# Patient Record
Sex: FEMALE | Race: WHITE | Employment: FULL TIME | URBAN - METROPOLITAN AREA
[De-identification: names, ages, dates, MRNs, and addresses within clinical notes are randomized per-mention and may not be internally consistent; named-entity substitution may affect disease eponyms.]

---

## 2017-01-13 ENCOUNTER — HOSPITAL ENCOUNTER (OUTPATIENT)
Dept: PHYSICAL THERAPY | Age: 42
Discharge: HOME | End: 2017-01-13
Payer: COMMERCIAL

## 2017-01-13 PROCEDURE — 97162 PT EVAL MOD COMPLEX 30 MIN: CPT

## 2017-01-13 PROCEDURE — 97110 THERAPEUTIC EXERCISES: CPT

## 2017-01-16 ENCOUNTER — HOSPITAL ENCOUNTER (OUTPATIENT)
Dept: PHYSICAL THERAPY | Age: 42
Discharge: HOME | End: 2017-01-16
Payer: COMMERCIAL

## 2017-01-16 NOTE — PROGRESS NOTES
Ambulatory/Rehab Services H2 Model Falls Risk Assessment    Risk Factor Pts. ·   Confusion/Disorientation/Impulsivity  []    4 ·   Symptomatic Depression  []   2 ·   Altered Elimination  []   1 ·   Dizziness/Vertigo  []   1 ·   Gender (Male)  []   1 ·   Any administered antiepileptics (anticonvulsants):  []   2 ·   Any administered benzodiazepines:  []   1 ·   Visual Impairment (specify):  []   1 ·   Portable Oxygen Use  []   1 ·   Orthostatic ? BP  []   1 ·   History of Recent Falls (within 3 mos.)  [x]   5     Ability to Rise from Chair (choose one) Pts. ·   Ability to rise in a single movement  []   0 ·   Pushes up, successful in one attempt  [x]   1 ·   Multiple attempts, but successful  []   3 ·   Unable to rise without assistance  []   4   Total: (5 or greater = High Risk) 6     Falls Prevention Plan:   []                Physical Limitations to Exercise (specify):   []                Mobility Assistance Device (type):   []                Exercise/Equipment Adaptation (specify):    ©2010 [facility] [address] Patent #[id number].  Federal Law prohibits the replication, distribution or use without written permission from [facility] [employer]

## 2017-01-16 NOTE — PROGRESS NOTES
Scooby Nunez  : 1975 Therapy Center at [facility]  [address]  [phone]   [phone]         OUTPATIENT PHYSICAL THERAPY:Initial Assessment 2017      ICD-10: Treatment Diagnosis:   Lumbago with sciatica, right side (M54.41)       Precautions/Allergies:   Tramadol   Fall Risk Score: 7 (? 5 = High Risk)  MD Orders: Eval and Treat  MEDICAL/REFERRING DIAGNOSIS:  Lumbar radiculopathy [M54.16]   DATE OF ONSET: fall on 11/10/2016  REFERRING PHYSICIAN: Ganga Torres [address]: 2017     INITIAL ASSESSMENT:  Ms. Scooby Nunez has attended 1 physical therapy session including initial evaluation. Scooby Nunez exhibits decreased flexibility, increased pain, decreased postural and core strength, decreased functional tolerance. Patient demonstrates pelvic malalignment upon initial evaluation with decreased posture and impaired transfer tolerance. Scooby Nunez will benefit from skilled PT (medically necessary) to address above deficits affecting participation in basic ADLs and overall functional tolerance. Manual techniques (stretching, joint mobilizations, soft tissue mobilization/myofascial release), postural exercises/education, therapeutic techniques/activities, and HEP will be performed as appropriate addressing Antonette Liz's current condition. PROBLEM LIST (Impacting functional limitations):  1. Decreased Strength  2. Decreased ADL/Functional Activities  3. Decreased Transfer Abilities  4. Decreased Ambulation Ability/Technique  5. Decreased Balance  6. Increased Pain  7. Decreased Activity Tolerance  8. Decreased Work Simplification/Energy Conservation Techniques  9. Increased Fatigue  10. Decreased Flexibility/Joint Mobility  11. Decreased Knowledge of Precautions  12. Decreased [place] with Home Exercise Program  13.  impaired body mechanics INTERVENTIONS PLANNED:  1. Balance Exercise  2. Bed Mobility  3. Cold  4. Cryotherapy  5. Electrical Stimulation  6. Family Education  7. Gait Training  8. Heat  9. Home Exercise Program (HEP)  10. Manual Therapy  11. Neuromuscular Re-education/Strengthening  12. Range of Motion (ROM)  13. Therapeutic Activites  14. Therapeutic Exercise/Strengthening  15. Transfer Training   TREATMENT PLAN:  Effective Dates: 1/13/2017 TO 4/13/2017. Frequency/Duration: 2 times a week for 8 weeks  GOALS: (Goals have been discussed and agreed upon with patient.)  Short Term Goals 4 weeks   1. Shalini Hernandez will be independent with HEP  2. Shalini Hernandez will participate in LE stretching program to increase flexibility  3. Shalini Hernandez will participate in core stabilization exercises to help with stabilization during ADLs  4. Shalini Hernandez will participate in LE strengthening program with weights as appropriate to help with gait and elevations  5. Shalini Hernandez will participate in static and dynamic balance activities to decrease the risk for falls  6. Shalini Hernandez will tolerate manual therapy/joint mobilizations/soft tissue to increase ROM and decrease pain  7. Patient will demonstrate safe body mechanics with lifting, bending, and transfers for improved lumbar spine pain and activity tolerance. Long Term Goals 8 weeks   1. Shalini Hernandez will demonstrate a 20 point improvement on the Oswestry to show improvement in function  2. Shalini Hernandez will report 0/10 pain at rest and during ADLs  3. Shalini Hernandez will demonstrate 5/5 LE strength on manual muscle testing  4. Shalini Hernandez will be able to perform SLS >5 seconds bilaterally to help with gait and improve balance  Rehabilitation Potential For Stated Goals: Good  Regarding Antonette Liz's therapy, I certify that the treatment plan above will be carried out by a therapist or under their direction.   Thank you for this referral,  Nellie Pope, PT     Referring Physician Signature: Stephanie Mueller*              Date                    HISTORY:   History of Present Injury/Illness (Reason for Referral):  Ms. Reyna Eason reports getting her foot caught between boxes in a freezer at work and falling backwards into a metal rack and boxes on 11/10/2016. She reports immediate pain at that time that has continued and is increased with walking, getting up from the bed, up from a chair, and with sitting greater than 15 minutes. She reports that her pain is right sided and radiates into the right foot. She also reports that she has difficulty with picking any bending, twisting, and lifting. MRI imaging demonstrated no discal injury and no significant structural damage to the lumbar tissue. Patient goals are to return to work and to be safe with back movement for prevention of re-injury or further injury. Patient reports that symptoms have not changed since injury. Past Medical History/Comorbidities:   Ms. Reyna Eason  has a past medical history of Hypothyroid. Ms. Reyna Eason  has a past surgical history that includes hysterectomy and cholecystectomy. Social History/Living Environment:     lives in private residence with family  Prior Level of Function/Work/Activity:  Patient worked and was independent with all home and work duties prior to injury  Note: Patient denies any increase of symptoms with cough, sneeze or valsalva. Patient denies any saddle paresthesia or bowel/bladder deficits. Personal Factors:          Coping Style:  Seems to catastrophize injury        Social Background:  Some indications of family stress        Profession:          Past/Current Experience:  No previous history of back pain        Other factors that influence how disability is experienced by the patient:  Previous high level family stress due to loss of spouse in traumatic event.   Current Medications:    Current Outpatient Prescriptions:     diclofenac EC (VOLTAREN) 75 mg EC tablet, Take 1 Tab by mouth two (2) times a day., Disp: 10 Tab, Rfl: 0    levothyroxine (SYNTHROID) 75 mcg tablet, Take  by mouth Daily (before breakfast). , Disp: , Rfl:    Flexeril  norco  Ibeuprophen    Date Last Reviewed:  1/16/2017   Number of Personal Factors/Comorbidities that affect the Plan of Care: 1-2: MODERATE COMPLEXITY   EXAMINATION:   Observation/Orthostatic Postural Assessment:          Antalgic gait pattern with decreased right LE loading during gait. Right trunk lean in standing. Palpation:          Elevated right psis in standing and sitting. Tenderness with palpation of right quadratus lumborum and right piriformis  ROM:            AROM/PROM         Joint: Eval Date: 11/13/2106 Re-Assess Date:  Re-Assess Date:     RIGHT LEFT RIGHT LEFT RIGHT LEFT   Knee Extension         Knee Flexion         Hip Flexion         Hip Abduction         Lumbar Flexion 50% with Pain        Lumbar Extension 50% with pain        Lumbar Side-bending 50% with pain 50%with pain       Lumbar Rotation           Strength:    Joint: Eval Date:  Re-Assess Date:  Re-Assess Date:     RIGHT LEFT RIGHT LEFT RIGHT LEFT   Knee Flexion 5/5 5/5       Knee Extension 5/5 5/5       Hip Flexion 4/5 5/5       Hip Abduction 4/5 5/5       Ankle Dorsiflexion 5/5 5/5                           Single leg stance right/left: needed support on right              Deep squat: unable due to Right LE pain and c/o weakness    Ham 90/90:   RIGHT LE: -40   LEFT LE: -20    Special Tests: Assessed @ Initial Visit    ISRRAEL 4: + on right   SLUMP TEST: +on right  Neurological Screen: Assessed @ Initial Visit    RADIATING SYMPTOMS?: YES  Functional Mobility:  Assessed @ Initial Visit Affecting participation in basic ADLs and functional tasks.   Poor spinal mechanics with supine-sit, sit-stand, and picking up objects that demonstrated increased spinal flexion and discal loading    Balance:          Poor stability on right LE   Body Structures Involved:  1. Nerves  2. Joints  3. Muscles  4. Ligaments Body Functions Affected:  1. Mental  2. Sensory/Pain  3. Neuromusculoskeletal  4. Movement Related  5. postural awareness Activities and Participation Affected:  1. General Tasks and Demands  2. Mobility  3. Self Care  4. Domestic Life  5. Interpersonal Interactions and Relationships  6. Community, Social and [place]  7. work duties   Number of elements that affect the Plan of Care: 3: MODERATE COMPLEXITY   CLINICAL PRESENTATION:   Presentation: Evolving clinical presentation with changing clinical characteristics: MODERATE COMPLEXITY   CLINICAL DECISION MAKING:   Outcome Measure: Tool Used: Modified Oswestry Low Back Pain Questionnaire  Score:  Initial: 35/50  Most Recent: X/50 (Date: -- )   Interpretation of Score: Each section is scored on a 0-5 scale, 5 representing the greatest disability. The scores of each section are added together for a total score of 50. Score 0 1-10 11-20 21-30 31-40 41-49 50   Modifier CH CI CJ CK CL CM CN       Medical Necessity:   · Skilled intervention continues to be required due to above deficits affecting participation in basic ADLs and overall functional tolerance. Reason for Services/Other Comments:  · Patient continues to require skilled intervention due to  above deficits affecting participation in basic ADLs and overall functional tolerance. Use of outcome tool(s) and clinical judgement create a POC that gives a: Questionable prediction of patient's progress: MODERATE COMPLEXITY   TREATMENT:   (In addition to Assessment/Re-Assessment sessions the following treatments were rendered)  THERAPEUTIC EXERCISE: (20 minutes):  Exercises per grid below to improve mobility, strength and balance. Required minimal visual and verbal cues to promote proper body alignment and promote proper body posture. Progressed resistance, range and complexity of movement as indicated.      Date:  1/13/2017 Date:   Date: Activity/Exercise Parameters Parameters Parameters   Sciatic nerve mobilization 30\"x3     Prone lying for decreased lumbar loading x5'     Patient education on body mechanics x10'                                   MANUAL THERAPY: (0 minutes): Joint mobilization, Soft tissue mobilization and Manipulation was utilized and necessary because of the patient's restricted joint motion, painful spasm, restricted motion of soft tissue and.   (Used abbreviations: MET - muscle energy technique; PNF - proprioceptive neuromuscular facilitation; NMR - neuromuscular re-education; AP - anterior to posterior; PA - posterior to anterior)    MODALITIES: (10 minutes):      *  Cold Pack Therapy in order to provide analgesia, relieve muscle spasm and reduce inflammation and edema. No abnormal skin reaction present. Treatment/Session Assessment:  Patient had improved pain with transfers after treatment. · Pain/ Symptoms: Initial:   8/10 Post Session:  6/10 ·   Compliance with Program/Exercises: Will assess as treatment progresses. · Recommendations/Intent for next treatment session: \"Next visit will focus on advancements to more challenging activities\".   Total Treatment Duration:  PT Patient Time In/Time Out  Time In: 1300  Time Out: 1002 [facility],

## 2017-01-16 NOTE — PROGRESS NOTES
Fady Yu  : 1975 Therapy Center at [address]  [phone]   [phone]        OUTPATIENT DAILY NOTE    NAME/AGE/GENDER: Fday Yu is a 42 y.o. female. DATE: 2017    Patient cancelled for appointment today due to daughter-in-laws water broke. Will plan to follow up on next scheduled visit.     Rosario Adhikari, PT

## 2017-01-18 ENCOUNTER — HOSPITAL ENCOUNTER (OUTPATIENT)
Dept: PHYSICAL THERAPY | Age: 42
Discharge: HOME | End: 2017-01-18
Payer: COMMERCIAL

## 2017-01-18 PROCEDURE — 97140 MANUAL THERAPY 1/> REGIONS: CPT

## 2017-01-18 PROCEDURE — 97110 THERAPEUTIC EXERCISES: CPT

## 2017-01-18 NOTE — PROGRESS NOTES
Fela Martinez  : 1975 Therapy Center at [place] [address] [address] [facility]  [phone]   [phone]         OUTPATIENT PHYSICAL THERAPY:Daily Note 2017      ICD-10: Treatment Diagnosis:   Lumbago with sciatica, right side (M54.41)       Precautions/Allergies:   Tramadol   Fall Risk Score: 7 (? 5 = High Risk)  MD Orders: Eval and Treat  MEDICAL/REFERRING DIAGNOSIS:  Lumbar radiculopathy [M54.16]   DATE OF ONSET: fall on 11/10/2016  REFERRING PHYSICIAN: Ganga Torres [address]: 2017     INITIAL ASSESSMENT:  Ms. Fela Martinez has attended 1 physical therapy session including initial evaluation. Fela Martinez exhibits decreased flexibility, increased pain, decreased postural and core strength, decreased functional tolerance. Patient demonstrates pelvic malalignment upon initial evaluation with decreased posture and impaired transfer tolerance. Fela Martinez will benefit from skilled PT (medically necessary) to address above deficits affecting participation in basic ADLs and overall functional tolerance. Manual techniques (stretching, joint mobilizations, soft tissue mobilization/myofascial release), postural exercises/education, therapeutic techniques/activities, and HEP will be performed as appropriate addressing Antonette Liz's current condition. PROBLEM LIST (Impacting functional limitations):  1. Decreased Strength  2. Decreased ADL/Functional Activities  3. Decreased Transfer Abilities  4. Decreased Ambulation Ability/Technique  5. Decreased Balance  6. Increased Pain  7. Decreased Activity Tolerance  8. Decreased Work Simplification/Energy Conservation Techniques  9. Increased Fatigue  10. Decreased Flexibility/Joint Mobility  11. Decreased Knowledge of Precautions  12. Decreased [place] with Home Exercise Program  13.  impaired body mechanics INTERVENTIONS PLANNED:  1. Balance Exercise  2. Bed Mobility  3. Cold  4. Cryotherapy  5. Electrical Stimulation  6. Family Education  7. Gait Training  8. Heat  9. Home Exercise Program (HEP)  10. Manual Therapy  11. Neuromuscular Re-education/Strengthening  12. Range of Motion (ROM)  13. Therapeutic Activites  14. Therapeutic Exercise/Strengthening  15. Transfer Training   TREATMENT PLAN:  Effective Dates: 1/13/2017 TO 4/13/2017. Frequency/Duration: 2 times a week for 8 weeks  GOALS: (Goals have been discussed and agreed upon with patient.)  Short Term Goals 4 weeks   1. Lucy Rizzo will be independent with HEP  2. Lucy Shepherdkel will participate in LE stretching program to increase flexibility  3. Falugniestino Matthias will participate in core stabilization exercises to help with stabilization during ADLs  4. Falguniestrosaura Shepherdkel will participate in LE strengthening program with weights as appropriate to help with gait and elevations  5. Falguniestrosaura Matthias will participate in static and dynamic balance activities to decrease the risk for falls  6. Lucy Shepherdkel will tolerate manual therapy/joint mobilizations/soft tissue to increase ROM and decrease pain  7. Patient will demonstrate safe body mechanics with lifting, bending, and transfers for improved lumbar spine pain and activity tolerance. Long Term Goals 8 weeks   1. Lucy Shepherdkel will demonstrate a 20 point improvement on the Oswestry to show improvement in function  2. Selestrosaura Shepherdkel will report 0/10 pain at rest and during ADLs  3. Selestrosaura Shepherdkel will demonstrate 5/5 LE strength on manual muscle testing  4. Lucy Shepherdkel will be able to perform SLS >5 seconds bilaterally to help with gait and improve balance  Rehabilitation Potential For Stated Goals: Good  Regarding Antonette Liz's therapy, I certify that the treatment plan above will be carried out by a therapist or under their direction.   Thank you for this referral,  Brice Neely, PT     Referring Physician Signature: Melanie Ashley*              Date                    HISTORY:   History of Present Injury/Illness (Reason for Referral):  Ms. Gianna Wheeler reports getting her foot caught between boxes in a freezer at work and falling backwards into a metal rack and boxes on 11/10/2016. She reports immediate pain at that time that has continued and is increased with walking, getting up from the bed, up from a chair, and with sitting greater than 15 minutes. She reports that her pain is right sided and radiates into the right foot. She also reports that she has difficulty with picking any bending, twisting, and lifting. MRI imaging demonstrated no discal injury and no significant structural damage to the lumbar tissue. Patient goals are to return to work and to be safe with back movement for prevention of re-injury or further injury. Patient reports that symptoms have not changed since injury. Past Medical History/Comorbidities:   Ms. Gianna Wheeler  has a past medical history of Hypothyroid. Ms. Gianna Wheeler  has a past surgical history that includes hysterectomy and cholecystectomy. Social History/Living Environment:     lives in private residence with family  Prior Level of Function/Work/Activity:  Patient worked and was independent with all home and work duties prior to injury  Note: Patient denies any increase of symptoms with cough, sneeze or valsalva. Patient denies any saddle paresthesia or bowel/bladder deficits. Personal Factors:          Coping Style:  Seems to catastrophize injury        Social Background:  Some indications of family stress        Profession:          Past/Current Experience:  No previous history of back pain        Other factors that influence how disability is experienced by the patient:  Previous high level family stress due to loss of spouse in traumatic event.   Current Medications:    Current Outpatient Prescriptions:     diclofenac EC (VOLTAREN) 75 mg EC tablet, Take 1 Tab by mouth two (2) times a day., Disp: 10 Tab, Rfl: 0    levothyroxine (SYNTHROID) 75 mcg tablet, Take  by mouth Daily (before breakfast). , Disp: , Rfl:    Flexeril  norco  Ibeuprophen    Date Last Reviewed:  1/18/2017   Number of Personal Factors/Comorbidities that affect the Plan of Care: 1-2: MODERATE COMPLEXITY   EXAMINATION:   Observation/Orthostatic Postural Assessment:          Antalgic gait pattern with decreased right LE loading during gait. Right trunk lean in standing. Palpation:          Elevated right psis in standing and sitting. Tenderness with palpation of right quadratus lumborum and right piriformis  ROM:            AROM/PROM         Joint: Eval Date: 11/13/2106 Re-Assess Date:  Re-Assess Date:     RIGHT LEFT RIGHT LEFT RIGHT LEFT   Knee Extension         Knee Flexion         Hip Flexion         Hip Abduction         Lumbar Flexion 50% with Pain        Lumbar Extension 50% with pain        Lumbar Side-bending 50% with pain 50%with pain       Lumbar Rotation           Strength:    Joint: Eval Date:  Re-Assess Date:  Re-Assess Date:     RIGHT LEFT RIGHT LEFT RIGHT LEFT   Knee Flexion 5/5 5/5       Knee Extension 5/5 5/5       Hip Flexion 4/5 5/5       Hip Abduction 4/5 5/5       Ankle Dorsiflexion 5/5 5/5                           Single leg stance right/left: needed support on right              Deep squat: unable due to Right LE pain and c/o weakness    Ham 90/90:   RIGHT LE: -40   LEFT LE: -20    Special Tests: Assessed @ Initial Visit    ISRRAEL 4: + on right   SLUMP TEST: +on right  Neurological Screen: Assessed @ Initial Visit    RADIATING SYMPTOMS?: YES  Functional Mobility:  Assessed @ Initial Visit Affecting participation in basic ADLs and functional tasks.   Poor spinal mechanics with supine-sit, sit-stand, and picking up objects that demonstrated increased spinal flexion and discal loading    Balance:          Poor stability on right LE   Body Structures Involved:  1. Nerves  2. Joints  3. Muscles  4. Ligaments Body Functions Affected:  1. Mental  2. Sensory/Pain  3. Neuromusculoskeletal  4. Movement Related  5. postural awareness Activities and Participation Affected:  1. General Tasks and Demands  2. Mobility  3. Self Care  4. Domestic Life  5. Interpersonal Interactions and Relationships  6. Community, Social and [place]  7. work duties   Number of elements that affect the Plan of Care: 3: MODERATE COMPLEXITY   CLINICAL PRESENTATION:   Presentation: Evolving clinical presentation with changing clinical characteristics: MODERATE COMPLEXITY   CLINICAL DECISION MAKING:   Outcome Measure: Tool Used: Modified Oswestry Low Back Pain Questionnaire  Score:  Initial: 35/50  Most Recent: X/50 (Date: -- )   Interpretation of Score: Each section is scored on a 0-5 scale, 5 representing the greatest disability. The scores of each section are added together for a total score of 50. Score 0 1-10 11-20 21-30 31-40 41-49 50   Modifier CH CI CJ CK CL CM CN       Medical Necessity:   · Skilled intervention continues to be required due to above deficits affecting participation in basic ADLs and overall functional tolerance. Reason for Services/Other Comments:  · Patient continues to require skilled intervention due to  above deficits affecting participation in basic ADLs and overall functional tolerance. Use of outcome tool(s) and clinical judgement create a POC that gives a: Questionable prediction of patient's progress: MODERATE COMPLEXITY   TREATMENT:   (In addition to Assessment/Re-Assessment sessions the following treatments were rendered)  THERAPEUTIC EXERCISE: (20 minutes):  Exercises per grid below to improve mobility, strength and balance. Required minimal visual and verbal cues to promote proper body alignment and promote proper body posture. Progressed resistance, range and complexity of movement as indicated.      Date:  1/13/2017 Date:  1/18/2017 Date: Activity/Exercise Parameters Parameters Parameters   Sciatic nerve mobilization 30\"x3     Prone lying for decreased lumbar loading x5' x5'    Patient education on body mechanics x10' x5'    physiostep  x10' w/ moist hot pack to lumbar spine                            MANUAL THERAPY: ( 25 minutes): Joint mobilization to right sacroiliac junction, Soft tissue mobilization and Manipulation was utilized and necessary because of the patient's restricted joint motion, painful spasm, restricted motion of soft tissue. Soft tissue mobilization to right thoracolumbar paraspinals. (Used abbreviations: MET - muscle energy technique; PNF - proprioceptive neuromuscular facilitation; NMR - neuromuscular re-education; AP - anterior to posterior; PA - posterior to anterior)    MODALITIES: (10 minutes):      *  Cold Pack Therapy in order to provide analgesia, relieve muscle spasm and reduce inflammation and edema. No abnormal skin reaction present. Treatment/Session Assessment:  Patient had improved pain with transfers after treatment. · Pain/ Symptoms: Initial:   8/10 Post Session:  6/10 ·   Compliance with Program/Exercises: Will assess as treatment progresses. · Recommendations/Intent for next treatment session: \"Next visit will focus on advancements to more challenging activities\".   Total Treatment Duration:  PT Patient Time In/Time Out  Time In: 1015  Time Out: [address],

## 2017-01-20 ENCOUNTER — HOSPITAL ENCOUNTER (OUTPATIENT)
Dept: PHYSICAL THERAPY | Age: 42
Discharge: HOME | End: 2017-01-20
Payer: COMMERCIAL

## 2017-01-20 NOTE — PROGRESS NOTES
Brisa Naranjo  : 1975 Therapy Center at [address]  [phone]   [phone]        OUTPATIENT DAILY NOTE    NAME/AGE/GENDER: Brisa Naranjo is a 42 y.o. female. DATE: 2017    Patient cancelled for appointment today due to family issues. Will plan to follow up on next scheduled visit.     Sara Malik, PT

## 2017-01-23 ENCOUNTER — HOSPITAL ENCOUNTER (OUTPATIENT)
Dept: PHYSICAL THERAPY | Age: 42
Discharge: HOME | End: 2017-01-23
Payer: COMMERCIAL

## 2017-01-23 PROCEDURE — 97140 MANUAL THERAPY 1/> REGIONS: CPT

## 2017-01-23 PROCEDURE — 97110 THERAPEUTIC EXERCISES: CPT

## 2017-01-23 NOTE — PROGRESS NOTES
Uday Prieto  : 1975 [facility] at  [address]  [phone]   [phone]         OUTPATIENT PHYSICAL THERAPY:Daily Note 2017      ICD-10: Treatment Diagnosis:   Lumbago with sciatica, right side (M54.41)       Precautions/Allergies:   Tramadol   Fall Risk Score: 7 (? 5 = High Risk)  MD Orders: Eval and Treat  MEDICAL/REFERRING DIAGNOSIS:  Lumbar radiculopathy [M54.16]   DATE OF ONSET: fall on 11/10/2016  REFERRING PHYSICIAN: Ganga Torres [address]: 2017     INITIAL ASSESSMENT:  Ms. Uday Prieto has attended 1 physical therapy session including initial evaluation. Uday Prieto exhibits decreased flexibility, increased pain, decreased postural and core strength, decreased functional tolerance. Patient demonstrates pelvic malalignment upon initial evaluation with decreased posture and impaired transfer tolerance. Uday Prieto will benefit from skilled PT (medically necessary) to address above deficits affecting participation in basic ADLs and overall functional tolerance. Manual techniques (stretching, joint mobilizations, soft tissue mobilization/myofascial release), postural exercises/education, therapeutic techniques/activities, and HEP will be performed as appropriate addressing Antonette Liz's current condition. PROBLEM LIST (Impacting functional limitations):  1. Decreased Strength  2. Decreased ADL/Functional Activities  3. Decreased Transfer Abilities  4. Decreased Ambulation Ability/Technique  5. Decreased Balance  6. Increased Pain  7. Decreased Activity Tolerance  8. Decreased Work Simplification/Energy Conservation Techniques  9. Increased Fatigue  10. Decreased Flexibility/Joint Mobility  11. Decreased Knowledge of Precautions  12. Decreased [place] with Home Exercise Program  13.  impaired body mechanics INTERVENTIONS PLANNED:  1. Balance Exercise  2. Bed Mobility  3. Cold  4. Cryotherapy  5. Electrical Stimulation  6. Family Education  7. Gait Training  8. Heat  9. Home Exercise Program (HEP)  10. Manual Therapy  11. Neuromuscular Re-education/Strengthening  12. Range of Motion (ROM)  13. Therapeutic Activites  14. Therapeutic Exercise/Strengthening  15. Transfer Training   TREATMENT PLAN:  Effective Dates: 1/13/2017 TO 4/13/2017. Frequency/Duration: 2 times a week for 8 weeks  GOALS: (Goals have been discussed and agreed upon with patient.)  Short Term Goals 4 weeks   1. Oziel Quiroga will be independent with HEP  2. Oziel Quiroga will participate in LE stretching program to increase flexibility  3. Oziel Quiroga will participate in core stabilization exercises to help with stabilization during ADLs  4. Oziel Quiroga will participate in LE strengthening program with weights as appropriate to help with gait and elevations  5. Oziel Quiroga will participate in static and dynamic balance activities to decrease the risk for falls  6. Oziel Quiroga will tolerate manual therapy/joint mobilizations/soft tissue to increase ROM and decrease pain  7. Patient will demonstrate safe body mechanics with lifting, bending, and transfers for improved lumbar spine pain and activity tolerance. Long Term Goals 8 weeks   1. Oziel Quiroga will demonstrate a 20 point improvement on the Oswestry to show improvement in function  2. Oziel Quiroga will report 0/10 pain at rest and during ADLs  3. Oziel Quiroga will demonstrate 5/5 LE strength on manual muscle testing  4. Oziel Quiroga will be able to perform SLS >5 seconds bilaterally to help with gait and improve balance  Rehabilitation Potential For Stated Goals: Good  Regarding Antonette Liz's therapy, I certify that the treatment plan above will be carried out by a therapist or under their direction.   Thank you for this referral,  Melody Warner PT     Referring Physician Signature: Jared Kidd*              Date                    HISTORY:   History of Present Injury/Illness (Reason for Referral):  Ms. Manuela Peña reports getting her foot caught between boxes in a freezer at work and falling backwards into a metal rack and boxes on 11/10/2016. She reports immediate pain at that time that has continued and is increased with walking, getting up from the bed, up from a chair, and with sitting greater than 15 minutes. She reports that her pain is right sided and radiates into the right foot. She also reports that she has difficulty with picking any bending, twisting, and lifting. MRI imaging demonstrated no discal injury and no significant structural damage to the lumbar tissue. Patient goals are to return to work and to be safe with back movement for prevention of re-injury or further injury. Patient reports that symptoms have not changed since injury. Past Medical History/Comorbidities:   Ms. Manuela Peña  has a past medical history of Hypothyroid. Ms. Manuela Peña  has a past surgical history that includes hysterectomy and cholecystectomy. Social History/Living Environment:     lives in private residence with family  Prior Level of Function/Work/Activity:  Patient worked and was independent with all home and work duties prior to injury  Note: Patient denies any increase of symptoms with cough, sneeze or valsalva. Patient denies any saddle paresthesia or bowel/bladder deficits. Personal Factors:          Coping Style:  Seems to catastrophize injury        Social Background:  Some indications of family stress        Profession:          Past/Current Experience:  No previous history of back pain        Other factors that influence how disability is experienced by the patient:  Previous high level family stress due to loss of spouse in traumatic event.   Current Medications:    Current Outpatient Prescriptions:     diclofenac EC (VOLTAREN) 75 mg EC tablet, Take 1 Tab by mouth two (2) times a day., Disp: 10 Tab, Rfl: 0    levothyroxine (SYNTHROID) 75 mcg tablet, Take  by mouth Daily (before breakfast). , Disp: , Rfl:    Flexeril  norco  Ibeuprophen    Date Last Reviewed:  1/23/2017   Number of Personal Factors/Comorbidities that affect the Plan of Care: 1-2: MODERATE COMPLEXITY   EXAMINATION:   Observation/Orthostatic Postural Assessment:          Antalgic gait pattern with decreased right LE loading during gait. Right trunk lean in standing. Palpation:          Elevated right psis in standing and sitting. Tenderness with palpation of right quadratus lumborum and right piriformis  ROM:            AROM/PROM         Joint: Eval Date: 11/13/2106 Re-Assess Date:  Re-Assess Date:     RIGHT LEFT RIGHT LEFT RIGHT LEFT   Knee Extension         Knee Flexion         Hip Flexion         Hip Abduction         Lumbar Flexion 50% with Pain        Lumbar Extension 50% with pain        Lumbar Side-bending 50% with pain 50%with pain       Lumbar Rotation           Strength:    Joint: Eval Date:  Re-Assess Date:  Re-Assess Date:     RIGHT LEFT RIGHT LEFT RIGHT LEFT   Knee Flexion 5/5 5/5       Knee Extension 5/5 5/5       Hip Flexion 4/5 5/5       Hip Abduction 4/5 5/5       Ankle Dorsiflexion 5/5 5/5                           Single leg stance right/left: needed support on right              Deep squat: unable due to Right LE pain and c/o weakness    Ham 90/90:   RIGHT LE: -40   LEFT LE: -20    Special Tests: Assessed @ Initial Visit    ISRRAEL 4: + on right   SLUMP TEST: +on right  Neurological Screen: Assessed @ Initial Visit    RADIATING SYMPTOMS?: YES  Functional Mobility:  Assessed @ Initial Visit Affecting participation in basic ADLs and functional tasks.   Poor spinal mechanics with supine-sit, sit-stand, and picking up objects that demonstrated increased spinal flexion and discal loading    Balance:          Poor stability on right LE   Body Structures Involved:  1. Nerves  2. Joints  3. Muscles  4. Ligaments Body Functions Affected:  1. Mental  2. Sensory/Pain  3. Neuromusculoskeletal  4. Movement Related  5. postural awareness Activities and Participation Affected:  1. General Tasks and Demands  2. Mobility  3. Self Care  4. Domestic Life  5. Interpersonal Interactions and Relationships  6. Community, Social and [place]  7. work duties   Number of elements that affect the Plan of Care: 3: MODERATE COMPLEXITY   CLINICAL PRESENTATION:   Presentation: Evolving clinical presentation with changing clinical characteristics: MODERATE COMPLEXITY   CLINICAL DECISION MAKING:   Outcome Measure: Tool Used: Modified Oswestry Low Back Pain Questionnaire  Score:  Initial: 35/50  Most Recent: X/50 (Date: -- )   Interpretation of Score: Each section is scored on a 0-5 scale, 5 representing the greatest disability. The scores of each section are added together for a total score of 50. Score 0 1-10 11-20 21-30 31-40 41-49 50   Modifier CH CI CJ CK CL CM CN       Medical Necessity:   · Skilled intervention continues to be required due to above deficits affecting participation in basic ADLs and overall functional tolerance. Reason for Services/Other Comments:  · Patient continues to require skilled intervention due to  above deficits affecting participation in basic ADLs and overall functional tolerance. Use of outcome tool(s) and clinical judgement create a POC that gives a: Questionable prediction of patient's progress: MODERATE COMPLEXITY   TREATMENT:   (In addition to Assessment/Re-Assessment sessions the following treatments were rendered)  THERAPEUTIC EXERCISE: (20 minutes):  Exercises per grid below to improve mobility, strength and balance. Required minimal visual and verbal cues to promote proper body alignment and promote proper body posture. Progressed resistance, range and complexity of movement as indicated.      Date:  1/13/2017 Date:  1/18/2017 Date:  1/23/2017   Activity/Exercise Parameters Parameters Parameters   Sciatic nerve mobilization 30\"x3     Prone lying for decreased lumbar loading x5' x5' x5'   Patient education on body mechanics x10' x5' x5'   physiostep  x10' w/ moist hot pack to lumbar spine x10' w/ moist hot pack to lumbar spine                           MANUAL THERAPY: ( 25 minutes): Joint mobilization to right sacroiliac junction, Soft tissue mobilization and Manipulation was utilized and necessary because of the patient's restricted joint motion, painful spasm, restricted motion of soft tissue. Soft tissue mobilization to right thoracolumbar paraspinals and right piriformis. (Used abbreviations: MET - muscle energy technique; PNF - proprioceptive neuromuscular facilitation; NMR - neuromuscular re-education; AP - anterior to posterior; PA - posterior to anterior)    MODALITIES: (10 minutes):      *  Cold Pack Therapy in order to provide analgesia, relieve muscle spasm and reduce inflammation and edema. No abnormal skin reaction present. Pre-treatment report: Patient reports improved pain with intensity with weight bearing. Treatment/Session Assessment:  Patient had improved pain with gait after treatment. Significant right piriformis pain with soft tissue mobilization. · Pain/ Symptoms: Initial:   6/10 Post Session:  5/10 ·   Compliance with Program/Exercises: Will assess as treatment progresses. · Recommendations/Intent for next treatment session: \"Next visit will focus on advancements to more challenging activities\".   Total Treatment Duration:  PT Patient Time In/Time Out  Time In: 1015  Time Out: [facility],

## 2017-01-25 ENCOUNTER — HOSPITAL ENCOUNTER (OUTPATIENT)
Dept: PHYSICAL THERAPY | Age: 42
Discharge: HOME | End: 2017-01-25
Payer: COMMERCIAL

## 2017-01-26 ENCOUNTER — HOSPITAL ENCOUNTER (OUTPATIENT)
Dept: PHYSICAL THERAPY | Age: 42
Discharge: HOME | End: 2017-01-26
Payer: COMMERCIAL

## 2017-01-26 PROCEDURE — 97110 THERAPEUTIC EXERCISES: CPT

## 2017-01-26 NOTE — PROGRESS NOTES
Fer Boyd  : 1975 [facility] at [address]  [phone]   [phone]         OUTPATIENT PHYSICAL THERAPY:Daily Note 2017      ICD-10: Treatment Diagnosis:   Lumbago with sciatica, right side (M54.41)       Precautions/Allergies:   Tramadol   Fall Risk Score: 7 (? 5 = High Risk)  MD Orders: Eval and Treat  MEDICAL/REFERRING DIAGNOSIS:  Lumbar radiculopathy [M54.16]   DATE OF ONSET: fall on 11/10/2016  REFERRING PHYSICIAN: Melissa [address] [place] [address] Avenue: 2017     INITIAL ASSESSMENT:  Ms. Fer Boyd has attended 1 physical therapy session including initial evaluation. Fer Boyd exhibits decreased flexibility, increased pain, decreased postural and core strength, decreased functional tolerance. Patient demonstrates pelvic malalignment upon initial evaluation with decreased posture and impaired transfer tolerance. Fer Boyd will benefit from skilled PT (medically necessary) to address above deficits affecting participation in basic ADLs and overall functional tolerance. Manual techniques (stretching, joint mobilizations, soft tissue mobilization/myofascial release), postural exercises/education, therapeutic techniques/activities, and HEP will be performed as appropriate addressing Antonette Liz's current condition. PROBLEM LIST (Impacting functional limitations):  1. Decreased Strength  2. Decreased ADL/Functional Activities  3. Decreased Transfer Abilities  4. Decreased Ambulation Ability/Technique  5. Decreased Balance  6. Increased Pain  7. Decreased Activity Tolerance  8. Decreased Work Simplification/Energy Conservation Techniques  9. Increased Fatigue  10. Decreased Flexibility/Joint Mobility  11. Decreased Knowledge of Precautions  12. Decreased [place] with Home Exercise Program  13.  impaired body mechanics INTERVENTIONS PLANNED:  1. Balance Exercise  2. Bed Mobility  3. Cold  4. Cryotherapy  5. Electrical Stimulation  6. Family Education  7. Gait Training  8. Heat  9. Home Exercise Program (HEP)  10. Manual Therapy  11. Neuromuscular Re-education/Strengthening  12. Range of Motion (ROM)  13. Therapeutic Activites  14. Therapeutic Exercise/Strengthening  15. Transfer Training   TREATMENT PLAN:  Effective Dates: 1/13/2017 TO 4/13/2017. Frequency/Duration: 2 times a week for 8 weeks  GOALS: (Goals have been discussed and agreed upon with patient.)  Short Term Goals 4 weeks   1. Sheela Bajwa will be independent with HEP  2. Sheela Bajwa will participate in LE stretching program to increase flexibility  3. Sheela Bajwa will participate in core stabilization exercises to help with stabilization during ADLs  4. Sheela Bajwa will participate in LE strengthening program with weights as appropriate to help with gait and elevations  5. Sheela Bajwa will participate in static and dynamic balance activities to decrease the risk for falls  6. Sheela Bajwa will tolerate manual therapy/joint mobilizations/soft tissue to increase ROM and decrease pain  7. Patient will demonstrate safe body mechanics with lifting, bending, and transfers for improved lumbar spine pain and activity tolerance. Long Term Goals 8 weeks   1. Sheela Bajwa will demonstrate a 20 point improvement on the Oswestry to show improvement in function  2. Sheela Bajwa will report 0/10 pain at rest and during ADLs  3. Sheela Bajwa will demonstrate 5/5 LE strength on manual muscle testing  4. Sheela Bajwa will be able to perform SLS >5 seconds bilaterally to help with gait and improve balance  Rehabilitation Potential For Stated Goals: Good  Regarding Antonette Liz's therapy, I certify that the treatment plan above will be carried out by a therapist or under their direction.   Thank you for this referral,  Ar Bernstein, PT     Referring Physician Signature: Sophie Bran*              Date                    HISTORY:    History of Present Injury/Illness (Reason for Referral):  Ms. Ramses Olsen reports getting her foot caught between boxes in a freezer at work and falling backwards into a metal rack and boxes on 11/10/2016. She reports immediate pain at that time that has continued and is increased with walking, getting up from the bed, up from a chair, and with sitting greater than 15 minutes. She reports that her pain is right sided and radiates into the right foot. She also reports that she has difficulty with picking any bending, twisting, and lifting. MRI imaging demonstrated no discal injury and no significant structural damage to the lumbar tissue. Patient goals are to return to work and to be safe with back movement for prevention of re-injury or further injury. Patient reports that symptoms have not changed since injury. Past Medical History/Comorbidities:   Ms. Ramses Olsen  has a past medical history of Hypothyroid. Ms. Ramses Olsen  has a past surgical history that includes hysterectomy and cholecystectomy. Social History/Living Environment:     lives in private residence with family  Prior Level of Function/Work/Activity:  Patient worked and was independent with all home and work duties prior to injury  Note: Patient denies any increase of symptoms with cough, sneeze or valsalva. Patient denies any saddle paresthesia or bowel/bladder deficits. Personal Factors:          Coping Style:  Seems to catastrophize injury        Social Background:  Some indications of family stress        Profession:          Past/Current Experience:  No previous history of back pain        Other factors that influence how disability is experienced by the patient:  Previous high level family stress due to loss of spouse in traumatic event.   Current Medications:    Current Outpatient Prescriptions:     diclofenac EC (VOLTAREN) 75 mg EC tablet, Take 1 Tab by mouth two (2) times a day., Disp: 10 Tab, Rfl: 0    levothyroxine (SYNTHROID) 75 mcg tablet, Take  by mouth Daily (before breakfast). , Disp: , Rfl:    Flexeril  norco  Ibeuprophen    Date Last Reviewed:  1/26/2017    Number of Personal Factors/Comorbidities that affect the Plan of Care:    EXAMINATION:    Observation/Orthostatic Postural Assessment:          Antalgic gait pattern with decreased right LE loading during gait. Right trunk lean in standing. Palpation:          Elevated right psis in standing and sitting. Tenderness with palpation of right quadratus lumborum and right piriformis  ROM:            AROM/PROM         Joint: Eval Date: 11/13/2106 Re-Assess Date:  Re-Assess Date:     RIGHT LEFT RIGHT LEFT RIGHT LEFT   Knee Extension         Knee Flexion         Hip Flexion         Hip Abduction         Lumbar Flexion 50% with Pain        Lumbar Extension 50% with pain        Lumbar Side-bending 50% with pain 50%with pain       Lumbar Rotation           Strength:    Joint: Eval Date:  Re-Assess Date:  Re-Assess Date:     RIGHT LEFT RIGHT LEFT RIGHT LEFT   Knee Flexion 5/5 5/5       Knee Extension 5/5 5/5       Hip Flexion 4/5 5/5       Hip Abduction 4/5 5/5       Ankle Dorsiflexion 5/5 5/5                           Single leg stance right/left: needed support on right              Deep squat: unable due to Right LE pain and c/o weakness    Ham 90/90:   RIGHT LE: -40   LEFT LE: -20    Special Tests: Assessed @ Initial Visit    ISRRAEL 4: + on right   SLUMP TEST: +on right  Neurological Screen: Assessed @ Initial Visit    RADIATING SYMPTOMS?: YES  Functional Mobility:  Assessed @ Initial Visit Affecting participation in basic ADLs and functional tasks.   Poor spinal mechanics with supine-sit, sit-stand, and picking up objects that demonstrated increased spinal flexion and discal loading    Balance:          Poor stability on right LE    Body Structures Involved:  1. Nerves  2. Joints  3. Muscles  4. Ligaments 5. Body Functions Affected:  1. Mental  2. Sensory/Pain  3. Neuromusculoskeletal  4. Movement Related  5. postural awareness Activities and Participation Affected:  1. General Tasks and Demands  2. Mobility  3. Self Care  4. Domestic Life  5. Interpersonal Interactions and Relationships  6. Community, Social and [place]  7. work duties   Number of elements that affect the Plan of Care:    CLINICAL PRESENTATION:    Presentation:  Evolving clinical presentation with changing clinical characteristics: MODERATE COMPLEXITY   CLINICAL DECISION MAKING:    Outcome Measure: Tool Used: Modified Oswestry Low Back Pain Questionnaire  Score:  Initial: 35/50  Most Recent: X/50 (Date: -- )   Interpretation of Score: Each section is scored on a 0-5 scale, 5 representing the greatest disability. The scores of each section are added together for a total score of 50. Score 0 1-10 11-20 21-30 31-40 41-49 50   Modifier CH CI CJ CK CL CM CN       Medical Necessity:   · Skilled intervention continues to be required due to above deficits affecting participation in basic ADLs and overall functional tolerance. Reason for Services/Other Comments:  · Patient continues to require skilled intervention due to  above deficits affecting participation in basic ADLs and overall functional tolerance. ·    Use of outcome tool(s) and clinical judgement create a POC that gives a:    TREATMENT:    (In addition to Assessment/Re-Assessment sessions the following treatments were rendered)  THERAPEUTIC EXERCISE: (40 minutes):  Exercises per grid below to improve mobility, strength and balance. Required minimal visual and verbal cues to promote proper body alignment and promote proper body posture. Progressed resistance, range and complexity of movement as indicated.      Date:  1/13/2017 Date:  1/18/2017 Date:  1/23/2017 Date:  1/26/2017   Activity/Exercise Parameters Parameters Parameters Sciatic nerve mobilization 30\"x3      Prone lying for decreased lumbar loading x5' x5' x5'    Patient education on body mechanics x10' x5' x5'    physiostep  x10' w/ moist hot pack to lumbar spine x10' w/ moist hot pack to lumbar spine x10' w/ moist hot pack to lumbar spine   Lower trunk rotation    2x10   Supine piriformis stretch    2x30\"   Bridge with adduction    2x10 w/ppt   Sidelying clams    2x10   Balance board    x2' ea direction   Tandem stance    On a/x 3x1'ea                                     MANUAL THERAPY: ( 0 minutes): Joint mobilization to right sacroiliac junction, Soft tissue mobilization and Manipulation was utilized and necessary because of the patient's restricted joint motion, painful spasm, restricted motion of soft tissue. Soft tissue mobilization to right thoracolumbar paraspinals and right piriformis. (Used abbreviations: MET - muscle energy technique; PNF - proprioceptive neuromuscular facilitation; NMR - neuromuscular re-education; AP - anterior to posterior; PA - posterior to anterior)    MODALITIES: (10 minutes):      *  Cold Pack Therapy in order to provide analgesia, relieve muscle spasm and reduce inflammation and edema. No abnormal skin reaction present. Pre-treatment report: Patient reports continued pain, but with improved weight bearing. Treatment/Session Assessment:  Patient had improved pain with gait after treatment. Significant right piriformis pain with soft tissue mobilization. · Pain/ Symptoms: Initial:   9/10 Post Session: 6 /10 ·   Compliance with Program/Exercises: Will assess as treatment progresses. · Recommendations/Intent for next treatment session: \"Next visit will focus on advancements to more challenging activities\".   Total Treatment Duration:  PT Patient Time In/Time Out  Time In: 0845  Time Out: Justin [id number], PT

## 2017-01-27 ENCOUNTER — HOSPITAL ENCOUNTER (OUTPATIENT)
Dept: PHYSICAL THERAPY | Age: 42
Discharge: HOME | End: 2017-01-27
Payer: COMMERCIAL

## 2017-01-27 PROCEDURE — 97110 THERAPEUTIC EXERCISES: CPT

## 2017-01-27 NOTE — PROGRESS NOTES
Annie Herrera  : 1975 [facility] at [address]  [phone]   [phone]        OUTPATIENT DAILY NOTE    NAME/AGE/GENDER: Annie Herrera is a 42 y.o. female. DATE: 2017      Patient re-scheduled for 2017. Will plan to follow up on next scheduled visit.     Tashi Enriquez, PT

## 2017-01-27 NOTE — PROGRESS NOTES
Bard Darnell  : 1975 [facility] [address]  [phone]   [phone]         OUTPATIENT PHYSICAL THERAPY:Daily Note 2017      ICD-10: Treatment Diagnosis:   Lumbago with sciatica, right side (M54.41)       Precautions/Allergies:   Tramadol   Fall Risk Score: 7 (? 5 = High Risk)  MD Orders: Eval and Treat  MEDICAL/REFERRING DIAGNOSIS:  Lumbar radiculopathy [M54.16]   DATE OF ONSET: fall on 11/10/2016  REFERRING PHYSICIAN: Melissa [address] [place] [address] Avenue: 2017     INITIAL ASSESSMENT:  Ms. Bard Darnell has attended 1 physical therapy session including initial evaluation. Bard Darnell exhibits decreased flexibility, increased pain, decreased postural and core strength, decreased functional tolerance. Patient demonstrates pelvic malalignment upon initial evaluation with decreased posture and impaired transfer tolerance. Bard Darnell will benefit from skilled PT (medically necessary) to address above deficits affecting participation in basic ADLs and overall functional tolerance. Manual techniques (stretching, joint mobilizations, soft tissue mobilization/myofascial release), postural exercises/education, therapeutic techniques/activities, and HEP will be performed as appropriate addressing Antonette Liz's current condition. PROBLEM LIST (Impacting functional limitations):  1. Decreased Strength  2. Decreased ADL/Functional Activities  3. Decreased Transfer Abilities  4. Decreased Ambulation Ability/Technique  5. Decreased Balance  6. Increased Pain  7. Decreased Activity Tolerance  8. Decreased Work Simplification/Energy Conservation Techniques  9. Increased Fatigue  10. Decreased Flexibility/Joint Mobility  11. Decreased Knowledge of Precautions  12. Decreased [place] with Home Exercise Program  13.  impaired body mechanics INTERVENTIONS PLANNED:  1. Balance Exercise  2. Bed Mobility  3. Cold  4. Cryotherapy  5. Electrical Stimulation  6. Family Education  7. Gait Training  8. Heat  9. Home Exercise Program (HEP)  10. Manual Therapy  11. Neuromuscular Re-education/Strengthening  12. Range of Motion (ROM)  13. Therapeutic Activites  14. Therapeutic Exercise/Strengthening  15. Transfer Training   TREATMENT PLAN:  Effective Dates: 1/13/2017 TO 4/13/2017. Frequency/Duration: 2 times a week for 8 weeks  GOALS: (Goals have been discussed and agreed upon with patient.)  Short Term Goals 4 weeks   1. Sheela Bajwa will be independent with HEP  2. Sheela Bajwa will participate in LE stretching program to increase flexibility  3. Sheela Bajwa will participate in core stabilization exercises to help with stabilization during ADLs  4. Sheela Bajwa will participate in LE strengthening program with weights as appropriate to help with gait and elevations  5. Sheela Bajwa will participate in static and dynamic balance activities to decrease the risk for falls  6. Sheela Bajwa will tolerate manual therapy/joint mobilizations/soft tissue to increase ROM and decrease pain  7. Patient will demonstrate safe body mechanics with lifting, bending, and transfers for improved lumbar spine pain and activity tolerance. Long Term Goals 8 weeks   1. Sheela Bajwa will demonstrate a 20 point improvement on the Oswestry to show improvement in function  2. Sheela Bajwa will report 0/10 pain at rest and during ADLs  3. Sheela Bajwa will demonstrate 5/5 LE strength on manual muscle testing  4. Sheela Bajwa will be able to perform SLS >5 seconds bilaterally to help with gait and improve balance  Rehabilitation Potential For Stated Goals: Good  Regarding Antonette Liz's therapy, I certify that the treatment plan above will be carried out by a therapist or under their direction.   Thank you for this referral,  Ar Bernstein, PT     Referring Physician Signature: Almarie Holstein*              Date                    HISTORY:    History of Present Injury/Illness (Reason for Referral):  Ms. Houston Castle reports getting her foot caught between boxes in a freezer at work and falling backwards into a metal rack and boxes on 11/10/2016. She reports immediate pain at that time that has continued and is increased with walking, getting up from the bed, up from a chair, and with sitting greater than 15 minutes. She reports that her pain is right sided and radiates into the right foot. She also reports that she has difficulty with picking any bending, twisting, and lifting. MRI imaging demonstrated no discal injury and no significant structural damage to the lumbar tissue. Patient goals are to return to work and to be safe with back movement for prevention of re-injury or further injury. Patient reports that symptoms have not changed since injury. Past Medical History/Comorbidities:   Ms. Houston Castle  has a past medical history of Hypothyroid. Ms. Houston Castle  has a past surgical history that includes hysterectomy and cholecystectomy. Social History/Living Environment:     lives in private residence with family  Prior Level of Function/Work/Activity:  Patient worked and was independent with all home and work duties prior to injury  Note: Patient denies any increase of symptoms with cough, sneeze or valsalva. Patient denies any saddle paresthesia or bowel/bladder deficits. Personal Factors:          Coping Style:  Seems to catastrophize injury        Social Background:  Some indications of family stress        Profession:          Past/Current Experience:  No previous history of back pain        Other factors that influence how disability is experienced by the patient:  Previous high level family stress due to loss of spouse in traumatic event.   Current Medications:    Current Outpatient Prescriptions:     diclofenac EC (VOLTAREN) 75 mg EC tablet, Take 1 Tab by mouth two (2) times a day., Disp: 10 Tab, Rfl: 0    levothyroxine (SYNTHROID) 75 mcg tablet, Take  by mouth Daily (before breakfast). , Disp: , Rfl:    Flexeril  norco  Ibeuprophen    Date Last Reviewed:  1/27/2017    Number of Personal Factors/Comorbidities that affect the Plan of Care:    EXAMINATION:    Observation/Orthostatic Postural Assessment:          Antalgic gait pattern with decreased right LE loading during gait. Right trunk lean in standing. Palpation:          Elevated right psis in standing and sitting. Tenderness with palpation of right quadratus lumborum and right piriformis  ROM:            AROM/PROM         Joint: Eval Date: 11/13/2106 Re-Assess Date:  Re-Assess Date:     RIGHT LEFT RIGHT LEFT RIGHT LEFT   Knee Extension         Knee Flexion         Hip Flexion         Hip Abduction         Lumbar Flexion 50% with Pain        Lumbar Extension 50% with pain        Lumbar Side-bending 50% with pain 50%with pain       Lumbar Rotation           Strength:    Joint: Eval Date:  Re-Assess Date:  Re-Assess Date:     RIGHT LEFT RIGHT LEFT RIGHT LEFT   Knee Flexion 5/5 5/5       Knee Extension 5/5 5/5       Hip Flexion 4/5 5/5       Hip Abduction 4/5 5/5       Ankle Dorsiflexion 5/5 5/5                           Single leg stance right/left: needed support on right              Deep squat: unable due to Right LE pain and c/o weakness    Ham 90/90:   RIGHT LE: -40   LEFT LE: -20    Special Tests: Assessed @ Initial Visit    ISRRAEL 4: + on right   SLUMP TEST: +on right  Neurological Screen: Assessed @ Initial Visit    RADIATING SYMPTOMS?: YES  Functional Mobility:  Assessed @ Initial Visit Affecting participation in basic ADLs and functional tasks.   Poor spinal mechanics with supine-sit, sit-stand, and picking up objects that demonstrated increased spinal flexion and discal loading    Balance:          Poor stability on right LE    Body Structures Involved:  1. Nerves  2. Joints  3. Muscles  4. Ligaments 5. Body Functions Affected:  1. Mental  2. Sensory/Pain  3. Neuromusculoskeletal  4. Movement Related  5. postural awareness Activities and Participation Affected:  1. General Tasks and Demands  2. Mobility  3. Self Care  4. Domestic Life  5. Interpersonal Interactions and Relationships  6. Community, Social and [place]  7. work duties   Number of elements that affect the Plan of Care:    CLINICAL PRESENTATION:    Presentation:  Evolving clinical presentation with changing clinical characteristics: MODERATE COMPLEXITY   CLINICAL DECISION MAKING:    Outcome Measure: Tool Used: Modified Oswestry Low Back Pain Questionnaire  Score:  Initial: 35/50  Most Recent: X/50 (Date: -- )   Interpretation of Score: Each section is scored on a 0-5 scale, 5 representing the greatest disability. The scores of each section are added together for a total score of 50. Score 0 1-10 11-20 21-30 31-40 41-49 50   Modifier CH CI CJ CK CL CM CN       Medical Necessity:   · Skilled intervention continues to be required due to above deficits affecting participation in basic ADLs and overall functional tolerance. Reason for Services/Other Comments:  · Patient continues to require skilled intervention due to  above deficits affecting participation in basic ADLs and overall functional tolerance. ·    Use of outcome tool(s) and clinical judgement create a POC that gives a:    TREATMENT:    (In addition to Assessment/Re-Assessment sessions the following treatments were rendered)  THERAPEUTIC EXERCISE: (45 minutes):  Exercises per grid below to improve mobility, strength and balance. Required minimal visual and verbal cues to promote proper body alignment and promote proper body posture. Progressed resistance, range and complexity of movement as indicated.      Date:  1/13/2017 Date:  1/18/2017 Date:  1/23/2017 Date:  1/26/2017 Date:  1/27/2017   Activity/Exercise Parameters Parameters Parameters     Sciatic nerve mobilization 30\"x3       Prone lying for decreased lumbar loading x5' x5' x5'     Patient education on body mechanics x10' x5' x5'     physiostep  x10' w/ moist hot pack to lumbar spine x10' w/ moist hot pack to lumbar spine x10' w/ moist hot pack to lumbar spine x10' w/ moist hot pack to lumbar spine   Lower trunk rotation    2x10 3x10   Supine piriformis stretch    2x30\" 2x30\"   Bridge with adduction    2x10 w/ppt 2x10   Sidelying clams    2x10 2x10   Balance board    x2' ea direction x2' in each direction with verbal cueing for core bracing   Tandem stance    On a/x 3x1'ea On a/x 3x1'ea   Patient education on self SI mobilization     x5'                                 MANUAL THERAPY: ( 0 minutes): Joint mobilization to right sacroiliac junction, Soft tissue mobilization and Manipulation was utilized and necessary because of the patient's restricted joint motion, painful spasm, restricted motion of soft tissue. Soft tissue mobilization to right thoracolumbar paraspinals and right piriformis. (Used abbreviations: MET - muscle energy technique; PNF - proprioceptive neuromuscular facilitation; NMR - neuromuscular re-education; AP - anterior to posterior; PA - posterior to anterior)    MODALITIES: (10 minutes):      *  Cold Pack Therapy in order to provide analgesia, relieve muscle spasm and reduce inflammation and edema. No abnormal skin reaction present. Pre-treatment report: Patient reports fatigue with some improved pain with exercises  Treatment/Session Assessment:  Patient had improved pain with gait after treatment and improved stability with balance exercises today. · Pain/ Symptoms: Initial:   7/10 Post Session: 5 /10 ·   Compliance with Program/Exercises: Will assess as treatment progresses. · Recommendations/Intent for next treatment session: \"Next visit will focus on advancements to more challenging activities\".   Total Treatment Duration:  PT Patient Time In/Time Out  Time In: 1015  Time Out: Brian [id number], PT

## 2017-01-30 ENCOUNTER — HOSPITAL ENCOUNTER (OUTPATIENT)
Dept: PHYSICAL THERAPY | Age: 42
Discharge: HOME | End: 2017-01-30
Payer: COMMERCIAL

## 2017-01-30 PROCEDURE — 97110 THERAPEUTIC EXERCISES: CPT

## 2017-02-01 ENCOUNTER — HOSPITAL ENCOUNTER (OUTPATIENT)
Dept: PHYSICAL THERAPY | Age: 42
Discharge: HOME | End: 2017-02-01
Payer: COMMERCIAL

## 2017-02-01 PROCEDURE — 97110 THERAPEUTIC EXERCISES: CPT

## 2017-02-03 ENCOUNTER — HOSPITAL ENCOUNTER (OUTPATIENT)
Dept: PHYSICAL THERAPY | Age: 42
Discharge: HOME | End: 2017-02-03
Payer: COMMERCIAL

## 2017-02-03 NOTE — PROGRESS NOTES
Fela Martinez  : 1975 [facility] [address]  [phone]   [phone]        OUTPATIENT DAILY NOTE    NAME/AGE/GENDER: Fela Martinez is a 42 y.o. female. DATE: 2/3/2017      Patient cancelled today due to having her teeth pulled at dentist.  Will plan to follow up on next scheduled visit.     Michelle Schmidt, PT

## 2017-02-06 ENCOUNTER — HOSPITAL ENCOUNTER (OUTPATIENT)
Dept: PHYSICAL THERAPY | Age: 42
Discharge: HOME | End: 2017-02-06
Payer: COMMERCIAL

## 2017-02-06 PROCEDURE — 97110 THERAPEUTIC EXERCISES: CPT

## 2017-02-06 NOTE — PROGRESS NOTES
Fady Yu  : 1975 [facility] [address]  [phone]   [phone]        Outpatient PHYSICAL THERAPY: PHYSICIAN COMMUNICATION    REFERRING PHYSICIAN: Geneva Rdz*  Return Physician Appointment: 2017  MEDICAL/REFERRING DIAGNOSIS:  · Lumbar radiculopathy [M54.16]  ATTENDANCE: Fady Yu has attended 8 out of 11 visits, with 3 cancellation(s) and 0 no shows. ASSESSMENT:  DATE: 2017    PROGRESS: Fady Yu reports some improved pain, but continued pain with weight bearing and fatigue with standing and walking. She may benefit from imaging of the right hip due to high level right hip and buttock pain. RECOMMENDATIONS: continued PT for strengthening. Consider specific right hip imaging to rule out damage due to extended duration of symptoms with weight bearing.     Thank you for this referral,  Rosario Adhikari, PT

## 2017-02-06 NOTE — PROGRESS NOTES
Romayne Olp  : 1975 [facility] [place] [address] [address] [facility]  [phone]   [phone]         OUTPATIENT PHYSICAL THERAPY:Daily Note 2017      ICD-10: Treatment Diagnosis:   Lumbago with sciatica, right side (M54.41)       Precautions/Allergies:   Tramadol   Fall Risk Score: 7 (? 5 = High Risk)  MD Orders: Eval and Treat  MEDICAL/REFERRING DIAGNOSIS:  Lumbar radiculopathy [M54.16]   DATE OF ONSET: fall on 11/10/2016  REFERRING PHYSICIAN: Melissa [address] [place] [address] Avenue: 2017     INITIAL ASSESSMENT:  Ms. Romayne Olp has attended 1 physical therapy session including initial evaluation. Romayne Olp exhibits decreased flexibility, increased pain, decreased postural and core strength, decreased functional tolerance. Patient demonstrates pelvic malalignment upon initial evaluation with decreased posture and impaired transfer tolerance. Romayne Olp will benefit from skilled PT (medically necessary) to address above deficits affecting participation in basic ADLs and overall functional tolerance. Manual techniques (stretching, joint mobilizations, soft tissue mobilization/myofascial release), postural exercises/education, therapeutic techniques/activities, and HEP will be performed as appropriate addressing Antonette Liz's current condition. PROBLEM LIST (Impacting functional limitations):  1. Decreased Strength  2. Decreased ADL/Functional Activities  3. Decreased Transfer Abilities  4. Decreased Ambulation Ability/Technique  5. Decreased Balance  6. Increased Pain  7. Decreased Activity Tolerance  8. Decreased Work Simplification/Energy Conservation Techniques  9. Increased Fatigue  10. Decreased Flexibility/Joint Mobility  11. Decreased Knowledge of Precautions  12. Decreased [place] with Home Exercise Program  13.  impaired body mechanics INTERVENTIONS PLANNED:  1. Balance Exercise  2. Bed Mobility  3. Cold  4. Cryotherapy  5. Electrical Stimulation  6. Family Education  7. Gait Training  8. Heat  9. Home Exercise Program (HEP)  10. Manual Therapy  11. Neuromuscular Re-education/Strengthening  12. Range of Motion (ROM)  13. Therapeutic Activites  14. Therapeutic Exercise/Strengthening  15. Transfer Training   TREATMENT PLAN:  Effective Dates: 1/13/2017 TO 4/13/2017. Frequency/Duration: 2 times a week for 8 weeks  GOALS: (Goals have been discussed and agreed upon with patient.)  Short Term Goals 4 weeks   1. Bard Darnell will be independent with HEP  2. Bard Darnell will participate in LE stretching program to increase flexibility  3. Bard Darnell will participate in core stabilization exercises to help with stabilization during ADLs  4. Bard Darnell will participate in LE strengthening program with weights as appropriate to help with gait and elevations  5. Bard Darnell will participate in static and dynamic balance activities to decrease the risk for falls  6. Bard Darnell will tolerate manual therapy/joint mobilizations/soft tissue to increase ROM and decrease pain  7. Patient will demonstrate safe body mechanics with lifting, bending, and transfers for improved lumbar spine pain and activity tolerance. Long Term Goals 8 weeks   1. Bard Darnell will demonstrate a 20 point improvement on the Oswestry to show improvement in function  2. Bard Darnell will report 0/10 pain at rest and during ADLs  3. Bard Darnell will demonstrate 5/5 LE strength on manual muscle testing  4. Bard Darnell will be able to perform SLS >5 seconds bilaterally to help with gait and improve balance  Rehabilitation Potential For Stated Goals: Good  Regarding Antonette Liz's therapy, I certify that the treatment plan above will be carried out by a therapist or under their direction.   Thank you for this referral,  Lolita Smart, PT     Referring Physician Signature: Pedro Delgado*              Date                    HISTORY:    History of Present Injury/Illness (Reason for Referral):  Ms. Bella Jenkins reports getting her foot caught between boxes in a freezer at work and falling backwards into a metal rack and boxes on 11/10/2016. She reports immediate pain at that time that has continued and is increased with walking, getting up from the bed, up from a chair, and with sitting greater than 15 minutes. She reports that her pain is right sided and radiates into the right foot. She also reports that she has difficulty with picking any bending, twisting, and lifting. MRI imaging demonstrated no discal injury and no significant structural damage to the lumbar tissue. Patient goals are to return to work and to be safe with back movement for prevention of re-injury or further injury. Patient reports that symptoms have not changed since injury. Past Medical History/Comorbidities:   Ms. Bella Jenkins  has a past medical history of Hypothyroid. Ms. Bella Jenkins  has a past surgical history that includes hysterectomy and cholecystectomy. Social History/Living Environment:     lives in private residence with family  Prior Level of Function/Work/Activity:  Patient worked and was independent with all home and work duties prior to injury  Note: Patient denies any increase of symptoms with cough, sneeze or valsalva. Patient denies any saddle paresthesia or bowel/bladder deficits. Personal Factors:          Coping Style:  Seems to catastrophize injury        Social Background:  Some indications of family stress        Profession:          Past/Current Experience:  No previous history of back pain        Other factors that influence how disability is experienced by the patient:  Previous high level family stress due to loss of spouse in traumatic event.   Current Medications:    Current Outpatient Prescriptions:     diclofenac EC (VOLTAREN) 75 mg EC tablet, Take 1 Tab by mouth two (2) times a day., Disp: 10 Tab, Rfl: 0    levothyroxine (SYNTHROID) 75 mcg tablet, Take  by mouth Daily (before breakfast). , Disp: , Rfl:    Flexeril  norco  Ibeuprophen    Date Last Reviewed:  2/6/2017    Number of Personal Factors/Comorbidities that affect the Plan of Care:    EXAMINATION:    Observation/Orthostatic Postural Assessment:          Antalgic gait pattern with decreased right LE loading during gait. Right trunk lean in standing. Palpation:          Elevated right psis in standing and sitting. Tenderness with palpation of right quadratus lumborum and right piriformis  ROM:            AROM/PROM         Joint: Eval Date: 11/13/2106 Re-Assess Date:  Re-Assess Date:     RIGHT LEFT RIGHT LEFT RIGHT LEFT   Knee Extension         Knee Flexion         Hip Flexion         Hip Abduction         Lumbar Flexion 50% with Pain        Lumbar Extension 50% with pain        Lumbar Side-bending 50% with pain 50%with pain       Lumbar Rotation           Strength:    Joint: Eval Date:  Re-Assess Date:  Re-Assess Date:     RIGHT LEFT RIGHT LEFT RIGHT LEFT   Knee Flexion 5/5 5/5       Knee Extension 5/5 5/5       Hip Flexion 4/5 5/5       Hip Abduction 4/5 5/5       Ankle Dorsiflexion 5/5 5/5                           Single leg stance right/left: needed support on right              Deep squat: unable due to Right LE pain and c/o weakness    Ham 90/90:   RIGHT LE: -40   LEFT LE: -20    Special Tests: Assessed @ Initial Visit    ISRRAEL 4: + on right   SLUMP TEST: +on right  Neurological Screen: Assessed @ Initial Visit    RADIATING SYMPTOMS?: YES  Functional Mobility:  Assessed @ Initial Visit Affecting participation in basic ADLs and functional tasks.   Poor spinal mechanics with supine-sit, sit-stand, and picking up objects that demonstrated increased spinal flexion and discal loading    Balance:          Poor stability on right LE    Body Structures Involved:  1. Nerves  2. Joints  3. Muscles  4. Ligaments 5. Body Functions Affected:  1. Mental  2. Sensory/Pain  3. Neuromusculoskeletal  4. Movement Related  5. postural awareness Activities and Participation Affected:  1. General Tasks and Demands  2. Mobility  3. Self Care  4. Domestic Life  5. Interpersonal Interactions and Relationships  6. Community, Social and [place]  7. work duties   Number of elements that affect the Plan of Care:    CLINICAL PRESENTATION:    Presentation:  Evolving clinical presentation with changing clinical characteristics: MODERATE COMPLEXITY   CLINICAL DECISION MAKING:    Outcome Measure: Tool Used: Modified Oswestry Low Back Pain Questionnaire  Score:  Initial: 35/50  Most Recent: 32/50 (Date: -- )   Interpretation of Score: Each section is scored on a 0-5 scale, 5 representing the greatest disability. The scores of each section are added together for a total score of 50. Score 0 1-10 11-20 21-30 31-40 41-49 50   Modifier CH CI CJ CK CL CM CN       Medical Necessity:   · Skilled intervention continues to be required due to above deficits affecting participation in basic ADLs and overall functional tolerance. Reason for Services/Other Comments:  · Patient continues to require skilled intervention due to  above deficits affecting participation in basic ADLs and overall functional tolerance. ·    Use of outcome tool(s) and clinical judgement create a POC that gives a:    TREATMENT:    (In addition to Assessment/Re-Assessment sessions the following treatments were rendered)  THERAPEUTIC EXERCISE: (40 minutes):  Exercises per grid below to improve mobility, strength and balance. Required minimal visual and verbal cues to promote proper body alignment and promote proper body posture. Progressed resistance, range and complexity of movement as indicated.      Date:  2/6/2017   Activity/Exercise    Sciatic nerve mobilization    Prone lying for decreased lumbar loading    Patient education on body mechanics    physiostep x10' w/ moist hot pack to lumbar spine   Lower trunk rotation    Supine piriformis stretch 3x30\" figure 4   Bridge with adduction    Sidelying clams    Balance board x2' in each direction with verbal cueing for core bracing   Tandem stance On a/x 3x1'ea   Patient education on self SI mobilization    Walking lunge stretch 3x20\"   Patient education on piriformis soft tissue mobilization with tennis ball x5'   Side steps/monster walks rtb 2x20\" ea direction         MANUAL THERAPY: ( 0 minutes): Joint mobilization to right sacroiliac junction, Soft tissue mobilization and Manipulation was utilized and necessary because of the patient's restricted joint motion, painful spasm, restricted motion of soft tissue. Soft tissue mobilization to right thoracolumbar paraspinals and right piriformis. (Used abbreviations: MET - muscle energy technique; PNF - proprioceptive neuromuscular facilitation; NMR - neuromuscular re-education; AP - anterior to posterior; PA - posterior to anterior)    MODALITIES: (0 minutes):      *  Cold Pack Therapy in order to provide analgesia, relieve muscle spasm and reduce inflammation and edema. No abnormal skin reaction present. Pre-treatment report: Patient reports continued pain and fatigue with some improving standing tolerance. Treatment/Session Assessment:  Patient tolerated new exercises with no increased symptoms today. Patient had improved pain with weight bearing after piriformis stretching. · Pain/ Symptoms: Initial:   7/10 Post Session: 5/10 ·   Compliance with Program/Exercises: Will assess as treatment progresses. · Recommendations/Intent for next treatment session: \"Next visit will focus on advancements to more challenging activities\".   Total Treatment Duration:  PT Patient Time In/Time Out  Time In: 1015  Time Out: [facility]

## 2017-02-13 ENCOUNTER — HOSPITAL ENCOUNTER (OUTPATIENT)
Dept: PHYSICAL THERAPY | Age: 42
Discharge: HOME | End: 2017-02-13
Payer: COMMERCIAL

## 2017-02-13 PROCEDURE — 97110 THERAPEUTIC EXERCISES: CPT

## 2017-02-13 PROCEDURE — 97140 MANUAL THERAPY 1/> REGIONS: CPT

## 2017-02-13 NOTE — PROGRESS NOTES
Uday Prieto  : 1975 [facility] [address]  [phone]   [phone]         OUTPATIENT PHYSICAL THERAPY:Daily Note 2017      ICD-10: Treatment Diagnosis:   Lumbago with sciatica, right side (M54.41)       Precautions/Allergies:   Tramadol   Fall Risk Score: 7 (? 5 = High Risk)  MD Orders: Eval and Treat  MEDICAL/REFERRING DIAGNOSIS:  Lumbar radiculopathy [M54.16]   DATE OF ONSET: fall on 11/10/2016  REFERRING PHYSICIAN: Melissa [address] [place] [address] Avenue: 2017     INITIAL ASSESSMENT:  Ms. Uday Prieto has attended 1 physical therapy session including initial evaluation. Uday Prieto exhibits decreased flexibility, increased pain, decreased postural and core strength, decreased functional tolerance. Patient demonstrates pelvic malalignment upon initial evaluation with decreased posture and impaired transfer tolerance. Uday Prieto will benefit from skilled PT (medically necessary) to address above deficits affecting participation in basic ADLs and overall functional tolerance. Manual techniques (stretching, joint mobilizations, soft tissue mobilization/myofascial release), postural exercises/education, therapeutic techniques/activities, and HEP will be performed as appropriate addressing Antonette Browniliana's current condition. PROBLEM LIST (Impacting functional limitations):  1. Decreased Strength  2. Decreased ADL/Functional Activities  3. Decreased Transfer Abilities  4. Decreased Ambulation Ability/Technique  5. Decreased Balance  6. Increased Pain  7. Decreased Activity Tolerance  8. Decreased Work Simplification/Energy Conservation Techniques  9. Increased Fatigue  10. Decreased Flexibility/Joint Mobility  11. Decreased Knowledge of Precautions  12. Decreased [place] with Home Exercise Program  13.  impaired body mechanics INTERVENTIONS PLANNED:  1. Balance Exercise  2. Bed Mobility  3. Cold  4. Cryotherapy  5. Electrical Stimulation  6. Family Education  7. Gait Training  8. Heat  9. Home Exercise Program (HEP)  10. Manual Therapy  11. Neuromuscular Re-education/Strengthening  12. Range of Motion (ROM)  13. Therapeutic Activites  14. Therapeutic Exercise/Strengthening  15. Transfer Training   TREATMENT PLAN:  Effective Dates: 1/13/2017 TO 4/13/2017. Frequency/Duration: 2 times a week for 8 weeks  GOALS: (Goals have been discussed and agreed upon with patient.)  Short Term Goals 4 weeks   1. Cliff Frees will be independent with HEP  2. Cliff Frees will participate in LE stretching program to increase flexibility  3. Cliff Frees will participate in core stabilization exercises to help with stabilization during ADLs  4. Cliff Frees will participate in LE strengthening program with weights as appropriate to help with gait and elevations  5. Cliff Frees will participate in static and dynamic balance activities to decrease the risk for falls  6. Cliff Frees will tolerate manual therapy/joint mobilizations/soft tissue to increase ROM and decrease pain  7. Patient will demonstrate safe body mechanics with lifting, bending, and transfers for improved lumbar spine pain and activity tolerance. Long Term Goals 8 weeks   1. Cliff Frees will demonstrate a 20 point improvement on the Oswestry to show improvement in function  2. Cliff Frees will report 0/10 pain at rest and during ADLs  3. Cliff Frees will demonstrate 5/5 LE strength on manual muscle testing  4. Cliff Frees will be able to perform SLS >5 seconds bilaterally to help with gait and improve balance  Rehabilitation Potential For Stated Goals: Good  Regarding Antonette Liz's therapy, I certify that the treatment plan above will be carried out by a therapist or under their direction.   Thank you for this referral,  Ayaan Sousa, PTA     Referring Physician Signature: Mala Ok J*              Date                    HISTORY:    History of Present Injury/Illness (Reason for Referral):  Ms. Manuela Peña reports getting her foot caught between boxes in a freezer at work and falling backwards into a metal rack and boxes on 11/10/2016. She reports immediate pain at that time that has continued and is increased with walking, getting up from the bed, up from a chair, and with sitting greater than 15 minutes. She reports that her pain is right sided and radiates into the right foot. She also reports that she has difficulty with picking any bending, twisting, and lifting. MRI imaging demonstrated no discal injury and no significant structural damage to the lumbar tissue. Patient goals are to return to work and to be safe with back movement for prevention of re-injury or further injury. Patient reports that symptoms have not changed since injury. Past Medical History/Comorbidities:   Ms. Manuela Peña  has a past medical history of Hypothyroid. Ms. Manuela Peña  has a past surgical history that includes hysterectomy and cholecystectomy. Social History/Living Environment:     lives in private residence with family  Prior Level of Function/Work/Activity:  Patient worked and was independent with all home and work duties prior to injury  Note: Patient denies any increase of symptoms with cough, sneeze or valsalva. Patient denies any saddle paresthesia or bowel/bladder deficits. Personal Factors:          Coping Style:  Seems to catastrophize injury        Social Background:  Some indications of family stress        Profession:          Past/Current Experience:  No previous history of back pain        Other factors that influence how disability is experienced by the patient:  Previous high level family stress due to loss of spouse in traumatic event.   Current Medications:    Current Outpatient Prescriptions:     diclofenac EC (VOLTAREN) 75 mg EC tablet, Take 1 Tab by mouth two (2) times a day., Disp: 10 Tab, Rfl: 0    levothyroxine (SYNTHROID) 75 mcg tablet, Take  by mouth Daily (before breakfast). , Disp: , Rfl:    Flexeril  norco  Ibeuprophen    Date Last Reviewed:  2/13/2017    Number of Personal Factors/Comorbidities that affect the Plan of Care:    EXAMINATION:    Observation/Orthostatic Postural Assessment:          Antalgic gait pattern with decreased right LE loading during gait. Right trunk lean in standing. Palpation:          Elevated right psis in standing and sitting. Tenderness with palpation of right quadratus lumborum and right piriformis  ROM:            AROM/PROM         Joint: Eval Date: 11/13/2106 Re-Assess Date:  Re-Assess Date:     RIGHT LEFT RIGHT LEFT RIGHT LEFT   Knee Extension         Knee Flexion         Hip Flexion         Hip Abduction         Lumbar Flexion 50% with Pain        Lumbar Extension 50% with pain        Lumbar Side-bending 50% with pain 50%with pain       Lumbar Rotation           Strength:    Joint: Eval Date:  Re-Assess Date:  Re-Assess Date:     RIGHT LEFT RIGHT LEFT RIGHT LEFT   Knee Flexion 5/5 5/5       Knee Extension 5/5 5/5       Hip Flexion 4/5 5/5       Hip Abduction 4/5 5/5       Ankle Dorsiflexion 5/5 5/5                           Single leg stance right/left: needed support on right              Deep squat: unable due to Right LE pain and c/o weakness    Ham 90/90:   RIGHT LE: -40   LEFT LE: -20    Special Tests: Assessed @ Initial Visit    ISRRAEL 4: + on right   SLUMP TEST: +on right  Neurological Screen: Assessed @ Initial Visit    RADIATING SYMPTOMS?: YES  Functional Mobility:  Assessed @ Initial Visit Affecting participation in basic ADLs and functional tasks. Poor spinal mechanics with supine-sit, sit-stand, and picking up objects that demonstrated increased spinal flexion and discal loading    Balance:          Poor stability on right LE    Body Structures Involved:  1. Nerves  2. Joints  3. Muscles  4. Ligaments 5. Body Functions Affected:  1. Mental  2. Sensory/Pain  3. Neuromusculoskeletal  4. Movement Related  5. postural awareness Activities and Participation Affected:  1. General Tasks and Demands  2. Mobility  3. Self Care  4. Domestic Life  5. Interpersonal Interactions and Relationships  6. Community, Social and [place]  7. work duties   Number of elements that affect the Plan of Care:    CLINICAL PRESENTATION:    Presentation:  Evolving clinical presentation with changing clinical characteristics: MODERATE COMPLEXITY   CLINICAL DECISION MAKING:    Outcome Measure: Tool Used: Modified Oswestry Low Back Pain Questionnaire  Score:  Initial: 35/50  Most Recent: 32/50 (Date: -- )   Interpretation of Score: Each section is scored on a 0-5 scale, 5 representing the greatest disability. The scores of each section are added together for a total score of 50. Score 0 1-10 11-20 21-30 31-40 41-49 50   Modifier CH CI CJ CK CL CM CN       Medical Necessity:   · Skilled intervention continues to be required due to above deficits affecting participation in basic ADLs and overall functional tolerance. Reason for Services/Other Comments:  · Patient continues to require skilled intervention due to  above deficits affecting participation in basic ADLs and overall functional tolerance. ·    Use of outcome tool(s) and clinical judgement create a POC that gives a:    TREATMENT:    (In addition to Assessment/Re-Assessment sessions the following treatments were rendered)  THERAPEUTIC EXERCISE: (40 minutes):  Exercises per grid below to improve mobility, strength and balance. Required minimal visual and verbal cues to promote proper body alignment and promote proper body posture. Progressed resistance, range and complexity of movement as indicated.      Date:  2/6/2017 Date  2/13/17   Activity/Exercise     Sciatic nerve mobilization     Prone lying for decreased lumbar loading     Patient education on body mechanics     physiostep x10' w/ moist hot pack to lumbar spine Level 2   x 10 minutes with moist heat to  back   Lower trunk rotation     Supine piriformis stretch 3x30\" figure 4 3 x 30 second hold figure 4   Bridge with adduction     Sidelying clams     Balance board x2' in each direction with verbal cueing for core bracing x2' in each direction with verbal cueing for core bracing   Tandem stance On a/x 3x1'ea On blue foam beam walking forward and backward   Patient education on self SI mobilization     Walking lunge stretch 3x20\" 3 x 20 feet down and back   Patient education on piriformis soft tissue mobilization with tennis ball x5'    Side steps/monster walks rtb 2x20\" ea direction 2 x 20 feet each direction    Theraband in neutral pelvic position with core engaged with back to band push outs  X 10 reps   Yellow theratubing                        MANUAL THERAPY: ( 10 minutes): Joint mobilization to right sacroiliac junction, Soft tissue mobilization and Manipulation was utilized and necessary because of the patient's restricted joint motion, painful spasm, restricted motion of soft tissue. Soft tissue mobilization to right thoracolumbar paraspinals, quadratus area, latissimus and right piriformis. (Used abbreviations: MET - muscle energy technique; PNF - proprioceptive neuromuscular facilitation; NMR - neuromuscular re-education; AP - anterior to posterior; PA - posterior to anterior)    MODALITIES: (10 minutes):      *  Cold Pack Therapy in order to provide analgesia, relieve muscle spasm and reduce inflammation and edema. With patient supine with knee wedge in place and ice pack on right lumbar area and wrapped around right pelvic area to cover tender and painful areas. No abnormal skin reaction present. Pre-treatment report: Patient reports back and hip continues to be painful but more fatigue than pain. Pain level 6/10. Treatment/Session Assessment:  Patient tolerated new exercises with no increased symptoms today. Patient had improved pain with weight bearing after piriformis stretching. · Pain/ Symptoms: Initial:  6/10 Post Session: 3/10 ·   Compliance with Program/Exercises: Will assess as treatment progresses. · Recommendations/Intent for next treatment session: \"Next visit will focus on advancements to more challenging activities\".   Total Treatment Duration:  PT Patient Time In/Time Out  Time In: 0930  Time Out: [place], [place]

## 2017-02-15 ENCOUNTER — HOSPITAL ENCOUNTER (OUTPATIENT)
Dept: PHYSICAL THERAPY | Age: 42
Discharge: HOME | End: 2017-02-15
Payer: COMMERCIAL

## 2017-02-15 PROCEDURE — 97140 MANUAL THERAPY 1/> REGIONS: CPT

## 2017-02-15 PROCEDURE — 97110 THERAPEUTIC EXERCISES: CPT

## 2017-02-15 NOTE — PROGRESS NOTES
Delicia Bob  : 1975 [facility] [address]  [phone]   [id number] [id number]         OUTPATIENT PHYSICAL THERAPY:Daily Note 2/15/2017      ICD-10: Treatment Diagnosis:   Lumbago with sciatica, right side (M54.41)       Precautions/Allergies:   Tramadol   Fall Risk Score: 7 (? 5 = High Risk)  MD Orders: Eval and Treat  MEDICAL/REFERRING DIAGNOSIS:  Lumbar radiculopathy [M54.16]   DATE OF ONSET: fall on 11/10/2016  REFERRING PHYSICIAN: Melissa [address] [place] [address] Avenue: 2017     INITIAL ASSESSMENT:  Ms. Delicia Bob has attended 1 physical therapy session including initial evaluation. Delicia Bob exhibits decreased flexibility, increased pain, decreased postural and core strength, decreased functional tolerance. Patient demonstrates pelvic malalignment upon initial evaluation with decreased posture and impaired transfer tolerance. Delicia Bob will benefit from skilled PT (medically necessary) to address above deficits affecting participation in basic ADLs and overall functional tolerance. Manual techniques (stretching, joint mobilizations, soft tissue mobilization/myofascial release), postural exercises/education, therapeutic techniques/activities, and HEP will be performed as appropriate addressing Antonette Liz's current condition. PROBLEM LIST (Impacting functional limitations):  1. Decreased Strength  2. Decreased ADL/Functional Activities  3. Decreased Transfer Abilities  4. Decreased Ambulation Ability/Technique  5. Decreased Balance  6. Increased Pain  7. Decreased Activity Tolerance  8. Decreased Work Simplification/Energy Conservation Techniques  9. Increased Fatigue  10. Decreased Flexibility/Joint Mobility  11. Decreased Knowledge of Precautions  12. Decreased [place] with Home Exercise Program  13.  impaired body mechanics INTERVENTIONS PLANNED:  1. Balance Exercise  2. Bed Mobility  3. Cold  4. Cryotherapy  5. Electrical Stimulation  6. Family Education  7. Gait Training  8. Heat  9. Home Exercise Program (HEP)  10. Manual Therapy  11. Neuromuscular Re-education/Strengthening  12. Range of Motion (ROM)  13. Therapeutic Activites  14. Therapeutic Exercise/Strengthening  15. Transfer Training   TREATMENT PLAN:  Effective Dates: 1/13/2017 TO 4/13/2017. Frequency/Duration: 2 times a week for 8 weeks  GOALS: (Goals have been discussed and agreed upon with patient.)  Short Term Goals 4 weeks   1. Shelley Patel will be independent with HEP  2. Shelley Patel will participate in LE stretching program to increase flexibility  3. Shelley Patel will participate in core stabilization exercises to help with stabilization during ADLs  4. Shelley Patel will participate in LE strengthening program with weights as appropriate to help with gait and elevations  5. Shelley Patel will participate in static and dynamic balance activities to decrease the risk for falls  6. Shelley Patel will tolerate manual therapy/joint mobilizations/soft tissue to increase ROM and decrease pain  7. Patient will demonstrate safe body mechanics with lifting, bending, and transfers for improved lumbar spine pain and activity tolerance. Long Term Goals 8 weeks   1. Shelley Patel will demonstrate a 20 point improvement on the Oswestry to show improvement in function  2. Shelley Patel will report 0/10 pain at rest and during ADLs  3. Shelley Patel will demonstrate 5/5 LE strength on manual muscle testing  4. Shelley Patel will be able to perform SLS >5 seconds bilaterally to help with gait and improve balance  Rehabilitation Potential For Stated Goals: Good  Regarding Antonette Lzi's therapy, I certify that the treatment plan above will be carried out by a therapist or under their direction.   Thank you for this referral,  Christ Rod, PT     Referring Physician Signature: Hua Reece*              Date                    HISTORY:    History of Present Injury/Illness (Reason for Referral):  Ms. Ijeoma Ca reports getting her foot caught between boxes in a freezer at work and falling backwards into a metal rack and boxes on 11/10/2016. She reports immediate pain at that time that has continued and is increased with walking, getting up from the bed, up from a chair, and with sitting greater than 15 minutes. She reports that her pain is right sided and radiates into the right foot. She also reports that she has difficulty with picking any bending, twisting, and lifting. MRI imaging demonstrated no discal injury and no significant structural damage to the lumbar tissue. Patient goals are to return to work and to be safe with back movement for prevention of re-injury or further injury. Patient reports that symptoms have not changed since injury. Past Medical History/Comorbidities:   Ms. Ijeoma Ca  has a past medical history of Hypothyroid. Ms. Ijeoma Ca  has a past surgical history that includes hysterectomy and cholecystectomy. Social History/Living Environment:     lives in private residence with family  Prior Level of Function/Work/Activity:  Patient worked and was independent with all home and work duties prior to injury  Note: Patient denies any increase of symptoms with cough, sneeze or valsalva. Patient denies any saddle paresthesia or bowel/bladder deficits. Personal Factors:          Coping Style:  Seems to catastrophize injury        Social Background:  Some indications of family stress        Profession:          Past/Current Experience:  No previous history of back pain        Other factors that influence how disability is experienced by the patient:  Previous high level family stress due to loss of spouse in traumatic event.   Current Medications:    Current Outpatient Prescriptions:     diclofenac EC (VOLTAREN) 75 mg EC tablet, Take 1 Tab by mouth two (2) times a day., Disp: 10 Tab, Rfl: 0    levothyroxine (SYNTHROID) 75 mcg tablet, Take  by mouth Daily (before breakfast). , Disp: , Rfl:    Flexeril  norco  Ibeuprophen    Date Last Reviewed:  2/15/2017    Number of Personal Factors/Comorbidities that affect the Plan of Care:    EXAMINATION:    Observation/Orthostatic Postural Assessment:          Antalgic gait pattern with decreased right LE loading during gait. Right trunk lean in standing. Palpation:          Elevated right psis in standing and sitting. Tenderness with palpation of right quadratus lumborum and right piriformis  ROM:            AROM/PROM         Joint: Eval Date: 11/13/2106 Re-Assess Date:  Re-Assess Date:     RIGHT LEFT RIGHT LEFT RIGHT LEFT   Knee Extension         Knee Flexion         Hip Flexion         Hip Abduction         Lumbar Flexion 50% with Pain        Lumbar Extension 50% with pain        Lumbar Side-bending 50% with pain 50%with pain       Lumbar Rotation           Strength:    Joint: Eval Date:  Re-Assess Date:  Re-Assess Date:     RIGHT LEFT RIGHT LEFT RIGHT LEFT   Knee Flexion 5/5 5/5       Knee Extension 5/5 5/5       Hip Flexion 4/5 5/5       Hip Abduction 4/5 5/5       Ankle Dorsiflexion 5/5 5/5                           Single leg stance right/left: needed support on right              Deep squat: unable due to Right LE pain and c/o weakness    Ham 90/90:   RIGHT LE: -40   LEFT LE: -20    Special Tests: Assessed @ Initial Visit    ISRRAEL 4: + on right   SLUMP TEST: +on right  Neurological Screen: Assessed @ Initial Visit    RADIATING SYMPTOMS?: YES  Functional Mobility:  Assessed @ Initial Visit Affecting participation in basic ADLs and functional tasks.   Poor spinal mechanics with supine-sit, sit-stand, and picking up objects that demonstrated increased spinal flexion and discal loading    Balance:          Poor stability on right LE    Body Structures Involved:  1. Nerves  2. Joints  3. Muscles  4. Ligaments 5. Body Functions Affected:  1. Mental  2. Sensory/Pain  3. Neuromusculoskeletal  4. Movement Related  5. postural awareness Activities and Participation Affected:  1. General Tasks and Demands  2. Mobility  3. Self Care  4. Domestic Life  5. Interpersonal Interactions and Relationships  6. Community, Social and [place]  7. work duties   Number of elements that affect the Plan of Care:    CLINICAL PRESENTATION:    Presentation:  Evolving clinical presentation with changing clinical characteristics: MODERATE COMPLEXITY   CLINICAL DECISION MAKING:    Outcome Measure: Tool Used: Modified Oswestry Low Back Pain Questionnaire  Score:  Initial: 35/50  Most Recent: 32/50 (Date: -- )   Interpretation of Score: Each section is scored on a 0-5 scale, 5 representing the greatest disability. The scores of each section are added together for a total score of 50. Score 0 1-10 11-20 21-30 31-40 41-49 50   Modifier CH CI CJ CK CL CM CN       Medical Necessity:   · Skilled intervention continues to be required due to above deficits affecting participation in basic ADLs and overall functional tolerance. Reason for Services/Other Comments:  · Patient continues to require skilled intervention due to  above deficits affecting participation in basic ADLs and overall functional tolerance. ·    Use of outcome tool(s) and clinical judgement create a POC that gives a:    TREATMENT:    (In addition to Assessment/Re-Assessment sessions the following treatments were rendered)  THERAPEUTIC EXERCISE: (30 minutes):  Exercises per grid below to improve mobility, strength and balance. Required minimal visual and verbal cues to promote proper body alignment and promote proper body posture. Progressed resistance, range and complexity of movement as indicated.      Date:  2/6/2017 Date  2/13/17 Date:  2/15/2017   Activity/Exercise      Sciatic nerve mobilization      Prone lying for decreased lumbar loading      Patient education on body mechanics   Review for work duties x5'    physiostep x10' w/ moist hot pack to lumbar spine Level 2   x 10 minutes with moist heat to  back Level 2   x 10 minutes with moist heat to  back   Lower trunk rotation      Supine piriformis stretch 3x30\" figure 4 3 x 30 second hold figure 4    Bridge with adduction      Sidelying clams      Balance board x2' in each direction with verbal cueing for core bracing x2' in each direction with verbal cueing for core bracing x2'ea direction   Tandem stance On a/x 3x1'ea On blue foam beam walking forward and backward 2x1'ea with trunk rotation   Patient education on self SI mobilization      Walking lunge stretch 3x20\" 3 x 20 feet down and back [id number] down and back   Patient education on piriformis soft tissue mobilization with tennis ball x5'     Side steps/monster walks rtb 2x20\" ea direction 2 x 20 feet each direction  2x20\"   Theraband in neutral pelvic position with core engaged with back to band push outs  X 10 reps   Yellow theratubing    sb mary anne pose   10x10\" fwd                     MANUAL THERAPY: ( 15 minutes): Joint mobilization to right sacroiliac junction, Soft tissue mobilization and Manipulation was utilized and necessary because of the patient's restricted joint motion, painful spasm, restricted motion of soft tissue. Soft tissue mobilization to right thoracolumbar paraspinals, quadratus area, latissimus and right piriformis. (Used abbreviations: MET - muscle energy technique; PNF - proprioceptive neuromuscular facilitation; NMR - neuromuscular re-education; AP - anterior to posterior; PA - posterior to anterior)    MODALITIES: (0 minutes):      *  Cold Pack Therapy in order to provide analgesia, relieve muscle spasm and reduce inflammation and edema. With patient supine with knee wedge in place and ice pack on right lumbar area and wrapped around right pelvic area to cover tender and painful areas. No abnormal skin reaction present. Pre-treatment report: Patient reports back and hip continues to be painful but more fatigue than pain. Pain level 6/10. Treatment/Session Assessment:  Patient tolerated new exercises with no increased symptoms today. Patient had improved pain with weight bearing after piriformis stretching. · Pain/ Symptoms: Initial:  6/10 Post Session: 3/10 ·   Compliance with Program/Exercises: Will assess as treatment progresses. · Recommendations/Intent for next treatment session: \"Next visit will focus on advancements to more challenging activities\".   Total Treatment Duration:  PT Patient Time In/Time Out  Time In: 1530  Time Out: 4900 Deniz Chaves PT

## 2017-02-17 NOTE — PROGRESS NOTES
Please send a recent progress  Summary with objective measurements. Please fax or email: [phone]  or [email or web address]     Patient Progress Note 2017 (needed every 2 weeks)    Provider Name:  Adalid CHAO Patient's Name:  Renetta Barillas   FTUBP'O Date:  2017 Claim #: [id number]   :  1975 Surgery Date:    Eval Date: 2017 Service: Physical Therapy   Diagnosis: Lumbago, sciatica right side       AROM/PROM     STRENGTH      Joint:   Eval Date:  2017 Re-Assess:  2/15/2017 Re-Assess:    Joint: Eval Date:  2017 Re-Assess:  2/15/2017 Re-Assess: All joints WNL with pain at end range right hip IR and ER All joints WNL with pain at end range right hip IR and ER   Hip flexion R 4 with pain 4+ with pain         Hip abduction R 4 with pain 4+ with pain         Hip extension R 4 with pain 4+ with pain               PAIN LEVEL 8 7           Progress toward work related goals:  progressing   Remaining Deficits:  Pain with standing   New Goals:  Continue with IE goals   Summary of treatment provided:  Core stabilization exercise, soft tissue mobilization, body mechanics and postural training for safety with work duties. Co-morbidities slowing progress: []Diabetes/Metabolic Disease   []Infection   []Scarring   []Stroke    []Auto-Immune Diesease   []Morbid Obesity   []Heart Disease   []Pregnancy   []Osteoporosis    Recommendations: (use .alignrecommendations to refresh this section)  [x] Continue Current POC with current authorization; 8 Visits remaining  [] Recommend Continued therapy  2  x wk for  4  wks  [] Discharge    % Goals Met  [] D/C - poor compliance  [] D/C - plateau - f/u MD  Plan for D/C < 2 wks [] 2-4 wks [x] 4-6 wks [] >6 wks [] D/C expected w/in requested auth?   YES     Therapist Name:  Luiz Kate PT   Signature: ______________________________  Date:  2017   If Provider recommending additional therapy, provider must obtain MD RX and submit to [facility].   [id number]:        Page 1 of 1

## 2017-02-20 ENCOUNTER — HOSPITAL ENCOUNTER (OUTPATIENT)
Dept: PHYSICAL THERAPY | Age: 42
Discharge: HOME | End: 2017-02-20
Payer: COMMERCIAL

## 2017-02-20 NOTE — PROGRESS NOTES
[facility]rapy Center at [address], [facility]  [phone]   [phone]     OUTPATIENT DAILY NOTE     DATE: 2/20/2017    SUBJECTIVE:  Patient called and cancelled for appointment today due to work meeting. Will plan to follow up on next scheduled visit.     Faustino Angelucci, PTA

## 2017-02-22 ENCOUNTER — HOSPITAL ENCOUNTER (OUTPATIENT)
Dept: PHYSICAL THERAPY | Age: 42
Discharge: HOME | End: 2017-02-22
Payer: COMMERCIAL

## 2017-02-22 PROCEDURE — 97140 MANUAL THERAPY 1/> REGIONS: CPT

## 2017-02-22 PROCEDURE — 97110 THERAPEUTIC EXERCISES: CPT

## 2017-02-22 NOTE — PROGRESS NOTES
Erin Euceda  : 1975 [facility] at [address]  [phone]   [phone]         OUTPATIENT PHYSICAL THERAPY:Daily Note 2017      ICD-10: Treatment Diagnosis:   Lumbago with sciatica, right side (M54.41)       Precautions/Allergies:   Tramadol   Fall Risk Score: 7 (? 5 = High Risk)  MD Orders: Eval and Treat  MEDICAL/REFERRING DIAGNOSIS:  Lumbar radiculopathy [M54.16]   DATE OF ONSET: fall on 11/10/2016  REFERRING PHYSICIAN: Ganga Torres [address]: 2017     INITIAL ASSESSMENT:  Ms. Erin Euceda has attended 1 physical therapy session including initial evaluation. Erin Euceda exhibits decreased flexibility, increased pain, decreased postural and core strength, decreased functional tolerance. Patient demonstrates pelvic malalignment upon initial evaluation with decreased posture and impaired transfer tolerance. Erin Euceda will benefit from skilled PT (medically necessary) to address above deficits affecting participation in basic ADLs and overall functional tolerance. Manual techniques (stretching, joint mobilizations, soft tissue mobilization/myofascial release), postural exercises/education, therapeutic techniques/activities, and HEP will be performed as appropriate addressing Antonette Liz's current condition. PROBLEM LIST (Impacting functional limitations):  1. Decreased Strength  2. Decreased ADL/Functional Activities  3. Decreased Transfer Abilities  4. Decreased Ambulation Ability/Technique  5. Decreased Balance  6. Increased Pain  7. Decreased Activity Tolerance  8. Decreased Work Simplification/Energy Conservation Techniques  9. Increased Fatigue  10. Decreased Flexibility/Joint Mobility  11. Decreased Knowledge of Precautions  12. Decreased [place] with Home Exercise Program  13.  impaired body mechanics INTERVENTIONS PLANNED:  1. Balance Exercise  2. Bed Mobility  3. Cold  4. Cryotherapy  5. Electrical Stimulation  6. Family Education  7. Gait Training  8. Heat  9. Home Exercise Program (HEP)  10. Manual Therapy  11. Neuromuscular Re-education/Strengthening  12. Range of Motion (ROM)  13. Therapeutic Activites  14. Therapeutic Exercise/Strengthening  15. Transfer Training   TREATMENT PLAN:  Effective Dates: 1/13/2017 TO 4/13/2017. Frequency/Duration: 2 times a week for 8 weeks  GOALS: (Goals have been discussed and agreed upon with patient.)  Short Term Goals 4 weeks   1. Willie Chapin will be independent with HEP  2. Willie Chapin will participate in LE stretching program to increase flexibility  3. Willie Chapin will participate in core stabilization exercises to help with stabilization during ADLs  4. Willie Chapin will participate in LE strengthening program with weights as appropriate to help with gait and elevations  5. Willie Chapin will participate in static and dynamic balance activities to decrease the risk for falls  6. Willie Chapin will tolerate manual therapy/joint mobilizations/soft tissue to increase ROM and decrease pain  7. Patient will demonstrate safe body mechanics with lifting, bending, and transfers for improved lumbar spine pain and activity tolerance. Long Term Goals 8 weeks   1. Willie Chapin will demonstrate a 20 point improvement on the Oswestry to show improvement in function  2. Willie Chapin will report 0/10 pain at rest and during ADLs  3. Willie Chapin will demonstrate 5/5 LE strength on manual muscle testing  4. Willie Cahpin will be able to perform SLS >5 seconds bilaterally to help with gait and improve balance  Rehabilitation Potential For Stated Goals: Good  Regarding Antonette Liz's therapy, I certify that the treatment plan above will be carried out by a therapist or under their direction.   Thank you for this referral,  Jennifer Quintanilla, PT     Referring Physician Signature: Ayush Oglesby*              Date                    HISTORY:    History of Present Injury/Illness (Reason for Referral):  Ms. Susan Deluca reports getting her foot caught between boxes in a freezer at work and falling backwards into a metal rack and boxes on 11/10/2016. She reports immediate pain at that time that has continued and is increased with walking, getting up from the bed, up from a chair, and with sitting greater than 15 minutes. She reports that her pain is right sided and radiates into the right foot. She also reports that she has difficulty with picking any bending, twisting, and lifting. MRI imaging demonstrated no discal injury and no significant structural damage to the lumbar tissue. Patient goals are to return to work and to be safe with back movement for prevention of re-injury or further injury. Patient reports that symptoms have not changed since injury. Past Medical History/Comorbidities:   Ms. Susan Deluca  has a past medical history of Hypothyroid. Ms. Susan Deluca  has a past surgical history that includes hysterectomy and cholecystectomy. Social History/Living Environment:     lives in private residence with family  Prior Level of Function/Work/Activity:  Patient worked and was independent with all home and work duties prior to injury  Note: Patient denies any increase of symptoms with cough, sneeze or valsalva. Patient denies any saddle paresthesia or bowel/bladder deficits. Personal Factors:          Coping Style:  Seems to catastrophize injury        Social Background:  Some indications of family stress        Profession:          Past/Current Experience:  No previous history of back pain        Other factors that influence how disability is experienced by the patient:  Previous high level family stress due to loss of spouse in traumatic event.   Current Medications:    Current Outpatient Prescriptions:     diclofenac EC (VOLTAREN) 75 mg EC tablet, Take 1 Tab by mouth two (2) times a day., Disp: 10 Tab, Rfl: 0    levothyroxine (SYNTHROID) 75 mcg tablet, Take  by mouth Daily (before breakfast). , Disp: , Rfl:    Flexeril  norco  Ibeuprophen    Date Last Reviewed:  2/22/2017    Number of Personal Factors/Comorbidities that affect the Plan of Care:    EXAMINATION:    Observation/Orthostatic Postural Assessment:          Antalgic gait pattern with decreased right LE loading during gait. Right trunk lean in standing. Palpation:          Elevated right psis in standing and sitting. Tenderness with palpation of right quadratus lumborum and right piriformis  ROM:            AROM/PROM         Joint: Eval Date: 11/13/2106 Re-Assess Date:  Re-Assess Date:     RIGHT LEFT RIGHT LEFT RIGHT LEFT   Knee Extension         Knee Flexion         Hip Flexion         Hip Abduction         Lumbar Flexion 50% with Pain        Lumbar Extension 50% with pain        Lumbar Side-bending 50% with pain 50%with pain       Lumbar Rotation           Strength:    Joint: Eval Date:  Re-Assess Date:  Re-Assess Date:     RIGHT LEFT RIGHT LEFT RIGHT LEFT   Knee Flexion 5/5 5/5       Knee Extension 5/5 5/5       Hip Flexion 4/5 5/5       Hip Abduction 4/5 5/5       Ankle Dorsiflexion 5/5 5/5                           Single leg stance right/left: needed support on right              Deep squat: unable due to Right LE pain and c/o weakness    Ham 90/90:   RIGHT LE: -40   LEFT LE: -20    Special Tests: Assessed @ Initial Visit    ISRRAEL 4: + on right   SLUMP TEST: +on right  Neurological Screen: Assessed @ Initial Visit    RADIATING SYMPTOMS?: YES  Functional Mobility:  Assessed @ Initial Visit Affecting participation in basic ADLs and functional tasks.   Poor spinal mechanics with supine-sit, sit-stand, and picking up objects that demonstrated increased spinal flexion and discal loading    Balance:          Poor stability on right LE    Body Structures Involved:  1. Nerves  2. Joints  3. Muscles  4. Ligaments 5. Body Functions Affected:  1. Mental  2. Sensory/Pain  3. Neuromusculoskeletal  4. Movement Related  5. postural awareness Activities and Participation Affected:  1. General Tasks and Demands  2. Mobility  3. Self Care  4. Domestic Life  5. Interpersonal Interactions and Relationships  6. Community, Social and [place]  7. work duties   Number of elements that affect the Plan of Care:    CLINICAL PRESENTATION:    Presentation:  Evolving clinical presentation with changing clinical characteristics: MODERATE COMPLEXITY   CLINICAL DECISION MAKING:    Outcome Measure: Tool Used: Modified Oswestry Low Back Pain Questionnaire  Score:  Initial: 35/50  Most Recent: 32/50 (Date: -- )   Interpretation of Score: Each section is scored on a 0-5 scale, 5 representing the greatest disability. The scores of each section are added together for a total score of 50. Score 0 1-10 11-20 21-30 31-40 41-49 50   Modifier CH CI CJ CK CL CM CN       Medical Necessity:   · Skilled intervention continues to be required due to above deficits affecting participation in basic ADLs and overall functional tolerance. Reason for Services/Other Comments:  · Patient continues to require skilled intervention due to  above deficits affecting participation in basic ADLs and overall functional tolerance. ·    Use of outcome tool(s) and clinical judgement create a POC that gives a:    TREATMENT:    (In addition to Assessment/Re-Assessment sessions the following treatments were rendered)  THERAPEUTIC EXERCISE: (30 minutes):  Exercises per grid below to improve mobility, strength and balance. Required minimal visual and verbal cues to promote proper body alignment and promote proper body posture. Progressed resistance, range and complexity of movement as indicated.      Date:  2/22/2017   Activity/Exercise    Sciatic nerve mobilization    Prone lying for decreased lumbar loading Patient education on body mechanics Review for work duties x5'    physiostep Level 2   x 10 minutes with moist heat to  back   Lower trunk rotation    Supine piriformis stretch    Bridge with adduction    Sidelying clams    Balance board x2'ea direction   Tandem stance 2x1'ea with trunk rotation   Patient education on self SI mobilization    Walking lunge stretch [id number] down and back   Patient education on piriformis soft tissue mobilization with tennis ball    Side steps/monster walks 2x20\"   Theraband in neutral pelvic position with core engaged with back to band push outs    sb mary anne pose                  MANUAL THERAPY: ( 15 minutes): Joint mobilization to right sacroiliac junction, Soft tissue mobilization and Manipulation was utilized and necessary because of the patient's restricted joint motion, painful spasm, restricted motion of soft tissue. Soft tissue mobilization to right thoracolumbar paraspinals, quadratus area, latissimus and right piriformis. (Used abbreviations: MET - muscle energy technique; PNF - proprioceptive neuromuscular facilitation; NMR - neuromuscular re-education; AP - anterior to posterior; PA - posterior to anterior)    MODALITIES: (0 minutes):      *  Cold Pack Therapy in order to provide analgesia, relieve muscle spasm and reduce inflammation and edema. With patient supine with knee wedge in place and ice pack on right lumbar area and wrapped around right pelvic area to cover tender and painful areas. No abnormal skin reaction present. Pre-treatment report: Patient reports continued pain, but improved with regular position changes  Treatment/Session Assessment:  Patient tolerated new exercises with no increased symptoms today. Patient had improved pain with weight bearing after piriformis stretching. · Pain/ Symptoms: Initial:  5/10 Post Session: 2/10 ·   Compliance with Program/Exercises: Will assess as treatment progresses.   · Recommendations/Intent for next treatment session: \"Next visit will focus on advancements to more challenging activities\".   Total Treatment Duration:  PT Patient Time In/Time Out  Time In: 1530  Time Out: [facility],

## 2017-02-27 ENCOUNTER — HOSPITAL ENCOUNTER (OUTPATIENT)
Dept: PHYSICAL THERAPY | Age: 42
Discharge: HOME | End: 2017-02-27
Payer: COMMERCIAL

## 2017-02-27 PROCEDURE — 97110 THERAPEUTIC EXERCISES: CPT

## 2017-02-27 NOTE — PROGRESS NOTES
Yolanda Murphy  : 1975 [facility] at [facility]  [address]  [phone]   [phone]         OUTPATIENT PHYSICAL THERAPY:Daily Note 2017      ICD-10: Treatment Diagnosis:   Lumbago with sciatica, right side (M54.41)       Precautions/Allergies:   Tramadol   Fall Risk Score: 7 (? 5 = High Risk)  MD Orders: Eval and Treat  MEDICAL/REFERRING DIAGNOSIS:  Lumbar radiculopathy [M54.16]   DATE OF ONSET: fall on 11/10/2016  REFERRING PHYSICIAN: Melissa [address] [place] [address] Avenue: 2017     INITIAL ASSESSMENT:  Ms. Yolanda Murphy has attended 1 physical therapy session including initial evaluation. Yolanda Murphy exhibits decreased flexibility, increased pain, decreased postural and core strength, decreased functional tolerance. Patient demonstrates pelvic malalignment upon initial evaluation with decreased posture and impaired transfer tolerance. Yolanda Murphy will benefit from skilled PT (medically necessary) to address above deficits affecting participation in basic ADLs and overall functional tolerance. Manual techniques (stretching, joint mobilizations, soft tissue mobilization/myofascial release), postural exercises/education, therapeutic techniques/activities, and HEP will be performed as appropriate addressing Antonette Liz's current condition. PROBLEM LIST (Impacting functional limitations):  1. Decreased Strength  2. Decreased ADL/Functional Activities  3. Decreased Transfer Abilities  4. Decreased Ambulation Ability/Technique  5. Decreased Balance  6. Increased Pain  7. Decreased Activity Tolerance  8. Decreased Work Simplification/Energy Conservation Techniques  9. Increased Fatigue  10. Decreased Flexibility/Joint Mobility  11. Decreased Knowledge of Precautions  12. Decreased [place] with Home Exercise Program  13.  impaired body mechanics INTERVENTIONS PLANNED:  1. Balance Exercise  2. Bed Mobility  3. Cold  4. Cryotherapy  5. Electrical Stimulation  6. Family Education  7. Gait Training  8. Heat  9. Home Exercise Program (HEP)  10. Manual Therapy  11. Neuromuscular Re-education/Strengthening  12. Range of Motion (ROM)  13. Therapeutic Activites  14. Therapeutic Exercise/Strengthening  15. Transfer Training   TREATMENT PLAN:  Effective Dates: 1/13/2017 TO 4/13/2017. Frequency/Duration: 2 times a week for 8 weeks  GOALS: (Goals have been discussed and agreed upon with patient.)  Short Term Goals 4 weeks   1. Annie Herrera will be independent with HEP  2. Annie Herrera will participate in LE stretching program to increase flexibility  3. Annie Herrera will participate in core stabilization exercises to help with stabilization during ADLs  4. Annie Herrera will participate in LE strengthening program with weights as appropriate to help with gait and elevations  5. Annie Herrera will participate in static and dynamic balance activities to decrease the risk for falls  6. Annie Herrera will tolerate manual therapy/joint mobilizations/soft tissue to increase ROM and decrease pain  7. Patient will demonstrate safe body mechanics with lifting, bending, and transfers for improved lumbar spine pain and activity tolerance. Long Term Goals 8 weeks   1. Annie Herrera will demonstrate a 20 point improvement on the Oswestry to show improvement in function  2. Annie Herrera will report 0/10 pain at rest and during ADLs  3. Annie Herrera will demonstrate 5/5 LE strength on manual muscle testing  4. Annie Herrera will be able to perform SLS >5 seconds bilaterally to help with gait and improve balance  Rehabilitation Potential For Stated Goals: Good  Regarding Antonette Liz's therapy, I certify that the treatment plan above will be carried out by a therapist or under their direction.   Thank you for this referral,  Tashi Enriquez, PT     Referring Physician Signature: Rodriguez Ortega*              Date                    HISTORY:    History of Present Injury/Illness (Reason for Referral):  Ms. Salima Richardson reports getting her foot caught between boxes in a freezer at work and falling backwards into a metal rack and boxes on 11/10/2016. She reports immediate pain at that time that has continued and is increased with walking, getting up from the bed, up from a chair, and with sitting greater than 15 minutes. She reports that her pain is right sided and radiates into the right foot. She also reports that she has difficulty with picking any bending, twisting, and lifting. MRI imaging demonstrated no discal injury and no significant structural damage to the lumbar tissue. Patient goals are to return to work and to be safe with back movement for prevention of re-injury or further injury. Patient reports that symptoms have not changed since injury. Past Medical History/Comorbidities:   Ms. Salima Richardson  has a past medical history of Hypothyroid. Ms. Salima Richardson  has a past surgical history that includes hysterectomy and cholecystectomy. Social History/Living Environment:     lives in private residence with family  Prior Level of Function/Work/Activity:  Patient worked and was independent with all home and work duties prior to injury  Note: Patient denies any increase of symptoms with cough, sneeze or valsalva. Patient denies any saddle paresthesia or bowel/bladder deficits. Personal Factors:          Coping Style:  Seems to catastrophize injury        Social Background:  Some indications of family stress        Profession:          Past/Current Experience:  No previous history of back pain        Other factors that influence how disability is experienced by the patient:  Previous high level family stress due to loss of spouse in traumatic event.   Current Medications:    Current Outpatient Prescriptions:     diclofenac EC (VOLTAREN) 75 mg EC tablet, Take 1 Tab by mouth two (2) times a day., Disp: 10 Tab, Rfl: 0    levothyroxine (SYNTHROID) 75 mcg tablet, Take  by mouth Daily (before breakfast). , Disp: , Rfl:    Flexeril  norco  Ibeuprophen    Date Last Reviewed:  2/27/2017    Number of Personal Factors/Comorbidities that affect the Plan of Care:    EXAMINATION:    Observation/Orthostatic Postural Assessment:          Antalgic gait pattern with decreased right LE loading during gait. Right trunk lean in standing. Palpation:          Elevated right psis in standing and sitting. Tenderness with palpation of right quadratus lumborum and right piriformis  ROM:            AROM/PROM         Joint: Eval Date: 11/13/2106 Re-Assess Date:  Re-Assess Date:     RIGHT LEFT RIGHT LEFT RIGHT LEFT   Knee Extension         Knee Flexion         Hip Flexion         Hip Abduction         Lumbar Flexion 50% with Pain        Lumbar Extension 50% with pain        Lumbar Side-bending 50% with pain 50%with pain       Lumbar Rotation           Strength:    Joint: Eval Date:  Re-Assess Date:  Re-Assess Date:     RIGHT LEFT RIGHT LEFT RIGHT LEFT   Knee Flexion 5/5 5/5       Knee Extension 5/5 5/5       Hip Flexion 4/5 5/5       Hip Abduction 4/5 5/5       Ankle Dorsiflexion 5/5 5/5                           Single leg stance right/left: needed support on right              Deep squat: unable due to Right LE pain and c/o weakness    Ham 90/90:   RIGHT LE: -40   LEFT LE: -20    Special Tests: Assessed @ Initial Visit    ISRRAEL 4: + on right   SLUMP TEST: +on right  Neurological Screen: Assessed @ Initial Visit    RADIATING SYMPTOMS?: YES  Functional Mobility:  Assessed @ Initial Visit Affecting participation in basic ADLs and functional tasks.   Poor spinal mechanics with supine-sit, sit-stand, and picking up objects that demonstrated increased spinal flexion and discal loading    Balance:          Poor stability on right LE    Body Structures Involved:  1. Nerves  2. Joints  3. Muscles  4. Ligaments 5. Body Functions Affected:  1. Mental  2. Sensory/Pain  3. Neuromusculoskeletal  4. Movement Related  5. postural awareness Activities and Participation Affected:  1. General Tasks and Demands  2. Mobility  3. Self Care  4. Domestic Life  5. Interpersonal Interactions and Relationships  6. Community, Social and [place]  7. work duties   Number of elements that affect the Plan of Care:    CLINICAL PRESENTATION:    Presentation:  Evolving clinical presentation with changing clinical characteristics: MODERATE COMPLEXITY   CLINICAL DECISION MAKING:    Outcome Measure: Tool Used: Modified Oswestry Low Back Pain Questionnaire  Score:  Initial: 35/50  Most Recent: 32/50 (Date: -- )   Interpretation of Score: Each section is scored on a 0-5 scale, 5 representing the greatest disability. The scores of each section are added together for a total score of 50. Score 0 1-10 11-20 21-30 31-40 41-49 50   Modifier CH CI CJ CK CL CM CN       Medical Necessity:   · Skilled intervention continues to be required due to above deficits affecting participation in basic ADLs and overall functional tolerance. Reason for Services/Other Comments:  · Patient continues to require skilled intervention due to  above deficits affecting participation in basic ADLs and overall functional tolerance. ·    Use of outcome tool(s) and clinical judgement create a POC that gives a:    TREATMENT:    (In addition to Assessment/Re-Assessment sessions the following treatments were rendered)  THERAPEUTIC EXERCISE: (45 minutes):  Exercises per grid below to improve mobility, strength and balance. Required minimal visual and verbal cues to promote proper body alignment and promote proper body posture. Progressed resistance, range and complexity of movement as indicated.      Date:  2/27/2017   Activity/Exercise    Patient education on use of SI belt x10'       Patient education on body mechanics Review for work duties x5'    physiostep Level 2   x 10 minutes with moist heat to  back   Lower trunk rotation    Supine piriformis stretch 3x30\"   Bridge with adduction    Sidelying clams    Balance board x2'ea direction   Tandem stance 2x1'ea with trunk rotation   Patient education on self SI mobilization    Walking lunge stretch [id number] down and back   Patient education on piriformis soft tissue mobilization with tennis ball    Side steps/monster walks 2x20\"   Theraband in neutral pelvic position with core engaged with back to band push outs    sb mary anne pose    Supine nerve glides x5'             MANUAL THERAPY: (  minutes): Joint mobilization to right sacroiliac junction, Soft tissue mobilization and Manipulation was utilized and necessary because of the patient's restricted joint motion, painful spasm, restricted motion of soft tissue. Soft tissue mobilization to right thoracolumbar paraspinals, quadratus area, latissimus and right piriformis. (Used abbreviations: MET - muscle energy technique; PNF - proprioceptive neuromuscular facilitation; NMR - neuromuscular re-education; AP - anterior to posterior; PA - posterior to anterior)    MODALITIES: (0 minutes):      *  Cold Pack Therapy in order to provide analgesia, relieve muscle spasm and reduce inflammation and edema. With patient supine with knee wedge in place and ice pack on right lumbar area and wrapped around right pelvic area to cover tender and painful areas. No abnormal skin reaction present. Pre-treatment report: Patient reports continued pain, but improved with regular position changes. Reports that fatigue is most problematic at this time. Treatment/Session Assessment:  Patient tolerated new exercises with no increased symptoms today. Patient had improved pain with weight bearing after piriformis stretching and use of SI belt. · Pain/ Symptoms: Initial:  5/10 Post Session: 2/10 ·   Compliance with Program/Exercises:  Will assess as treatment progresses. · Recommendations/Intent for next treatment session: \"Next visit will focus on advancements to more challenging activities\".   Total Treatment Duration:  PT Patient Time In/Time Out  Time In: 1630  Time Out: Tj [address], PT

## 2017-03-01 ENCOUNTER — HOSPITAL ENCOUNTER (OUTPATIENT)
Dept: PHYSICAL THERAPY | Age: 42
Discharge: HOME | End: 2017-03-01
Payer: COMMERCIAL

## 2017-03-01 PROCEDURE — 97110 THERAPEUTIC EXERCISES: CPT

## 2017-03-01 NOTE — PROGRESS NOTES
Brisa Naranjo  : 1975 [facility] at [address]  [phone]   [phone]         OUTPATIENT PHYSICAL THERAPY:Daily Note 3/1/2017      ICD-10: Treatment Diagnosis:   Lumbago with sciatica, right side (M54.41)       Precautions/Allergies:   Tramadol   Fall Risk Score: 7 (? 5 = High Risk)  MD Orders: Eval and Treat  MEDICAL/REFERRING DIAGNOSIS:  Lumbar radiculopathy [M54.16]   DATE OF ONSET: fall on 11/10/2016  REFERRING PHYSICIAN: Melissa [address] [place] [address] Avenue: 2017     INITIAL ASSESSMENT:  Ms. Brisa Naranjo has attended 1 physical therapy session including initial evaluation. Brisa Naranjo exhibits decreased flexibility, increased pain, decreased postural and core strength, decreased functional tolerance. Patient demonstrates pelvic malalignment upon initial evaluation with decreased posture and impaired transfer tolerance. Brisa Naranjo will benefit from skilled PT (medically necessary) to address above deficits affecting participation in basic ADLs and overall functional tolerance. Manual techniques (stretching, joint mobilizations, soft tissue mobilization/myofascial release), postural exercises/education, therapeutic techniques/activities, and HEP will be performed as appropriate addressing Antonette Liz's current condition. PROBLEM LIST (Impacting functional limitations):  1. Decreased Strength  2. Decreased ADL/Functional Activities  3. Decreased Transfer Abilities  4. Decreased Ambulation Ability/Technique  5. Decreased Balance  6. Increased Pain  7. Decreased Activity Tolerance  8. Decreased Work Simplification/Energy Conservation Techniques  9. Increased Fatigue  10. Decreased Flexibility/Joint Mobility  11. Decreased Knowledge of Precautions  12. Decreased [place] with Home Exercise Program  13.  impaired body mechanics INTERVENTIONS PLANNED:  1. Balance Exercise  2. Bed Mobility  3. Cold  4. Cryotherapy  5. Electrical Stimulation  6. Family Education  7. Gait Training  8. Heat  9. Home Exercise Program (HEP)  10. Manual Therapy  11. Neuromuscular Re-education/Strengthening  12. Range of Motion (ROM)  13. Therapeutic Activites  14. Therapeutic Exercise/Strengthening  15. Transfer Training   TREATMENT PLAN:  Effective Dates: 1/13/2017 TO 4/13/2017. Frequency/Duration: 2 times a week for 8 weeks  GOALS: (Goals have been discussed and agreed upon with patient.)  Short Term Goals 4 weeks   1. Krista Maguire will be independent with HEP  2. Krista Maguire will participate in LE stretching program to increase flexibility  3. Krista Maguire will participate in core stabilization exercises to help with stabilization during ADLs  4. Krista Maguire will participate in LE strengthening program with weights as appropriate to help with gait and elevations  5. Krista Maguire will participate in static and dynamic balance activities to decrease the risk for falls  6. Krista Maguire will tolerate manual therapy/joint mobilizations/soft tissue to increase ROM and decrease pain  7. Patient will demonstrate safe body mechanics with lifting, bending, and transfers for improved lumbar spine pain and activity tolerance. Long Term Goals 8 weeks   1. Krista Maguire will demonstrate a 20 point improvement on the Oswestry to show improvement in function  2. Krista Maguire will report 0/10 pain at rest and during ADLs  3. Krista Maguire will demonstrate 5/5 LE strength on manual muscle testing  4. Krista Maguire will be able to perform SLS >5 seconds bilaterally to help with gait and improve balance  Rehabilitation Potential For Stated Goals: Good  Regarding Antonette Liz's therapy, I certify that the treatment plan above will be carried out by a therapist or under their direction.   Thank you for this referral,  Hollie Montenegro, PT     Referring Physician Signature: Jennifer Cerrato*              Date                    HISTORY:    History of Present Injury/Illness (Reason for Referral):  Ms. Marlin Ramirez reports getting her foot caught between boxes in a freezer at work and falling backwards into a metal rack and boxes on 11/10/2016. She reports immediate pain at that time that has continued and is increased with walking, getting up from the bed, up from a chair, and with sitting greater than 15 minutes. She reports that her pain is right sided and radiates into the right foot. She also reports that she has difficulty with picking any bending, twisting, and lifting. MRI imaging demonstrated no discal injury and no significant structural damage to the lumbar tissue. Patient goals are to return to work and to be safe with back movement for prevention of re-injury or further injury. Patient reports that symptoms have not changed since injury. Past Medical History/Comorbidities:   Ms. Marlin Ramirez  has a past medical history of Hypothyroid. Ms. Marlin Ramirez  has a past surgical history that includes hysterectomy and cholecystectomy. Social History/Living Environment:     lives in private residence with family  Prior Level of Function/Work/Activity:  Patient worked and was independent with all home and work duties prior to injury  Note: Patient denies any increase of symptoms with cough, sneeze or valsalva. Patient denies any saddle paresthesia or bowel/bladder deficits. Personal Factors:          Coping Style:  Seems to catastrophize injury        Social Background:  Some indications of family stress        Profession:          Past/Current Experience:  No previous history of back pain        Other factors that influence how disability is experienced by the patient:  Previous high level family stress due to loss of spouse in traumatic event.   Current Medications:    Current Outpatient Prescriptions:     diclofenac EC (VOLTAREN) 75 mg EC tablet, Take 1 Tab by mouth two (2) times a day., Disp: 10 Tab, Rfl: 0    levothyroxine (SYNTHROID) 75 mcg tablet, Take  by mouth Daily (before breakfast). , Disp: , Rfl:    Flexeril  norco  Ibeuprophen    Date Last Reviewed:  3/1/2017    Number of Personal Factors/Comorbidities that affect the Plan of Care:    EXAMINATION:    Observation/Orthostatic Postural Assessment:          Antalgic gait pattern with decreased right LE loading during gait. Right trunk lean in standing. Palpation:          Elevated right psis in standing and sitting. Tenderness with palpation of right quadratus lumborum and right piriformis  ROM:            AROM/PROM         Joint: Eval Date: 11/13/2106 Re-Assess Date:  Re-Assess Date:     RIGHT LEFT RIGHT LEFT RIGHT LEFT   Knee Extension         Knee Flexion         Hip Flexion         Hip Abduction         Lumbar Flexion 50% with Pain        Lumbar Extension 50% with pain        Lumbar Side-bending 50% with pain 50%with pain       Lumbar Rotation           Strength:    Joint: Eval Date:  Re-Assess Date:  Re-Assess Date:     RIGHT LEFT RIGHT LEFT RIGHT LEFT   Knee Flexion 5/5 5/5       Knee Extension 5/5 5/5       Hip Flexion 4/5 5/5       Hip Abduction 4/5 5/5       Ankle Dorsiflexion 5/5 5/5                           Single leg stance right/left: needed support on right              Deep squat: unable due to Right LE pain and c/o weakness    Ham 90/90:   RIGHT LE: -40   LEFT LE: -20    Special Tests: Assessed @ Initial Visit    ISRRAEL 4: + on right   SLUMP TEST: +on right  Neurological Screen: Assessed @ Initial Visit    RADIATING SYMPTOMS?: YES  Functional Mobility:  Assessed @ Initial Visit Affecting participation in basic ADLs and functional tasks.   Poor spinal mechanics with supine-sit, sit-stand, and picking up objects that demonstrated increased spinal flexion and discal loading    Balance:          Poor stability on right LE    Body Structures Involved:  1. Nerves  2. Joints  3. Muscles  4. Ligaments 5. Body Functions Affected:  1. Mental  2. Sensory/Pain  3. Neuromusculoskeletal  4. Movement Related  5. postural awareness Activities and Participation Affected:  1. General Tasks and Demands  2. Mobility  3. Self Care  4. Domestic Life  5. Interpersonal Interactions and Relationships  6. Community, Social and [place]  7. work duties   Number of elements that affect the Plan of Care:    CLINICAL PRESENTATION:    Presentation:  Evolving clinical presentation with changing clinical characteristics: MODERATE COMPLEXITY   CLINICAL DECISION MAKING:    Outcome Measure: Tool Used: Modified Oswestry Low Back Pain Questionnaire  Score:  Initial: 35/50  Most Recent: 32/50 (Date: -- )   Interpretation of Score: Each section is scored on a 0-5 scale, 5 representing the greatest disability. The scores of each section are added together for a total score of 50. Score 0 1-10 11-20 21-30 31-40 41-49 50   Modifier CH CI CJ CK CL CM CN       Medical Necessity:   · Skilled intervention continues to be required due to above deficits affecting participation in basic ADLs and overall functional tolerance. Reason for Services/Other Comments:  · Patient continues to require skilled intervention due to  above deficits affecting participation in basic ADLs and overall functional tolerance. ·    Use of outcome tool(s) and clinical judgement create a POC that gives a:    TREATMENT:    (In addition to Assessment/Re-Assessment sessions the following treatments were rendered)  THERAPEUTIC EXERCISE: (45 minutes):  Exercises per grid below to improve mobility, strength and balance. Required minimal visual and verbal cues to promote proper body alignment and promote proper body posture. Progressed resistance, range and complexity of movement as indicated.      Date:  3/1/2017   Activity/Exercise    Patient education on use of SI belt        Patient education on body mechanics Review for work duties x5'    physiostep Level 2   x 10 minutes with moist heat to  back   Lower trunk rotation    Supine piriformis stretch 3x30\"   sb supine walkouts x10   Pelvic clocks on exercise ball x10ea direction   Balance board x2'ea direction   Tandem stance 2x1'ea on a/x   Patient education on self SI mobilization    Walking lunge stretch [id number] down and back   Patient education on piriformis and plantar foot soft tissue mobilization with tennis ball x5'   Side steps/monster walks 2x20\"   Theraband in neutral pelvic position with core engaged with back to band push outs    sb mary anne pose    Supine nerve glides x5'   Single LE cone tap with TVA contraction 3x10 taps on each LE         MANUAL THERAPY: (  minutes): Joint mobilization to right sacroiliac junction, Soft tissue mobilization and Manipulation was utilized and necessary because of the patient's restricted joint motion, painful spasm, restricted motion of soft tissue. Soft tissue mobilization to right thoracolumbar paraspinals, quadratus area, latissimus and right piriformis. (Used abbreviations: MET - muscle energy technique; PNF - proprioceptive neuromuscular facilitation; NMR - neuromuscular re-education; AP - anterior to posterior; PA - posterior to anterior)    MODALITIES: (0 minutes):      *  Cold Pack Therapy in order to provide analgesia, relieve muscle spasm and reduce inflammation and edema. With patient supine with knee wedge in place and ice pack on right lumbar area and wrapped around right pelvic area to cover tender and painful areas. No abnormal skin reaction present. Pre-treatment report: Patient reports continued pain, but improved with regular position changes and use of SI belt. Reports that fatigue is most problematic at this time though gradually improving. Treatment/Session Assessment:  Patient tolerated new exercises with no increased symptoms today.   Patient had improved pain with weight bearing after piriformis stretching and use of SI belt. · Pain/ Symptoms: Initial:  5/10 Post Session: 2/10 ·   Compliance with Program/Exercises: Will assess as treatment progresses. · Recommendations/Intent for next treatment session: \"Next visit will focus on advancements to more challenging activities\".   Total Treatment Duration:  PT Patient Time In/Time Out  Time In: 5725  Time Out: UlMaria Alejandra Sloan 60, PT

## 2017-03-08 NOTE — PROGRESS NOTES
Please send a recent progress  Summary with objective measurements. Please fax or email: [phone]  or [email or web address]     Patient Progress Note 3/8/2017 (needed every 2 weeks)    Provider Name:  [address] [place] [address] NICOLETTE CHAO Patient's Name:  Karrie Mccloud   FMPRF'U Date:  3/8/2017 Claim #: [id number]   :  1975 Surgery Date:    Eval Date: 2017 Service: Physical Therapy   Diagnosis: Lumbago, sciatica right side       AROM/PROM     STRENGTH      Joint:   Eval Date:  2017 Re-Assess:  2/15/2017 Re-Assess:  3/1/2017  Joint: Eval Date:  2017 Re-Assess:  2/15/2017 Re-Assess:  3/1/2017    All joints WNL with pain at end range right hip IR and ER All joints WNL with pain at end range right hip IR and ER All joints WNL with pain at end range right hip IR and ER  Hip flexion R 4 with pain 4+ with pain 5 with pain        Hip abduction R 4 with pain 4+ with pain 4+ with pain        Hip extension R 4 with pain 4+ with pain 5 with pain              PAIN LEVEL 8 7 5          Progress toward work related goals:  progressing   Remaining Deficits:  Pain with standing   New Goals:  Continue with IE goals   Summary of treatment provided:  Core stabilization exercise, functional lifting mechanics with graded load, body mechanics and postural training for safety with work duties. Co-morbidities slowing progress: []Diabetes/Metabolic Disease   []Infection   []Scarring   []Stroke    []Auto-Immune Diesease   []Morbid Obesity   []Heart Disease   []Pregnancy   []Osteoporosis    Recommendations: (use .alignrecommendations to refresh this section)  [x] Continue Current POC with current authorization; 8 Visits remaining  [] Recommend Continued therapy  2  x wk for  4  wks  [] Discharge    % Goals Met  [] D/C - poor compliance  [] D/C - plateau - f/u MD  Plan for D/C < 2 wks [] 2-4 wks [x] 4-6 wks [] >6 wks [] D/C expected w/in requested auth?   YES     Therapist Name:  Faraz Castelan, PT Signature: ______________________________  Date:  3/8/2017   If Provider recommending additional therapy, provider must obtain MD RX and submit to [address].   [id number]:        Page 1 of 1

## 2017-03-14 ENCOUNTER — HOSPITAL ENCOUNTER (OUTPATIENT)
Dept: PHYSICAL THERAPY | Age: 42
Discharge: HOME | End: 2017-03-14
Payer: COMMERCIAL

## 2017-03-14 PROCEDURE — 97140 MANUAL THERAPY 1/> REGIONS: CPT

## 2017-03-14 PROCEDURE — 97110 THERAPEUTIC EXERCISES: CPT

## 2017-03-14 NOTE — PROGRESS NOTES
Daniella Rubio  : 1975 [facility] at [facility]  [address]  [phone]   [phone]         OUTPATIENT PHYSICAL THERAPY:Daily Note 3/14/2017      ICD-10: Treatment Diagnosis:   Lumbago with sciatica, right side (M54.41)       Precautions/Allergies:   Tramadol   Fall Risk Score: 7 (? 5 = High Risk)  MD Orders: Eval and Treat  MEDICAL/REFERRING DIAGNOSIS:  Lumbar radiculopathy [M54.16]   DATE OF ONSET: fall on 11/10/2016  REFERRING PHYSICIAN: Melissa [address] [place] [address] Avenue: 2017     INITIAL ASSESSMENT:  Ms. Daniella Rubio has attended 1 physical therapy session including initial evaluation. Daniella Rubio exhibits decreased flexibility, increased pain, decreased postural and core strength, decreased functional tolerance. Patient demonstrates pelvic malalignment upon initial evaluation with decreased posture and impaired transfer tolerance. Daniella Rubio will benefit from skilled PT (medically necessary) to address above deficits affecting participation in basic ADLs and overall functional tolerance. Manual techniques (stretching, joint mobilizations, soft tissue mobilization/myofascial release), postural exercises/education, therapeutic techniques/activities, and HEP will be performed as appropriate addressing Antonette Liz's current condition. PROBLEM LIST (Impacting functional limitations):  1. Decreased Strength  2. Decreased ADL/Functional Activities  3. Decreased Transfer Abilities  4. Decreased Ambulation Ability/Technique  5. Decreased Balance  6. Increased Pain  7. Decreased Activity Tolerance  8. Decreased Work Simplification/Energy Conservation Techniques  9. Increased Fatigue  10. Decreased Flexibility/Joint Mobility  11. Decreased Knowledge of Precautions  12. Decreased [place] with Home Exercise Program  13.  impaired body mechanics INTERVENTIONS PLANNED:  1. Balance Exercise  2. Bed Mobility  3. Cold  4. Cryotherapy  5. Electrical Stimulation  6. Family Education  7. Gait Training  8. Heat  9. Home Exercise Program (HEP)  10. Manual Therapy  11. Neuromuscular Re-education/Strengthening  12. Range of Motion (ROM)  13. Therapeutic Activites  14. Therapeutic Exercise/Strengthening  15. Transfer Training   TREATMENT PLAN:  Effective Dates: 1/13/2017 TO 4/13/2017. Frequency/Duration: 2 times a week for 8 weeks  GOALS: (Goals have been discussed and agreed upon with patient.)  Short Term Goals 4 weeks   1. Roma Goltz will be independent with HEP  2. Roma Goltz will participate in LE stretching program to increase flexibility  3. Roma Goltz will participate in core stabilization exercises to help with stabilization during ADLs  4. Roma Goltz will participate in LE strengthening program with weights as appropriate to help with gait and elevations  5. Roma Goltz will participate in static and dynamic balance activities to decrease the risk for falls  6. Roma Goltz will tolerate manual therapy/joint mobilizations/soft tissue to increase ROM and decrease pain  7. Patient will demonstrate safe body mechanics with lifting, bending, and transfers for improved lumbar spine pain and activity tolerance. Long Term Goals 8 weeks   1. Roma Goltz will demonstrate a 20 point improvement on the Oswestry to show improvement in function  2. Roma Goltz will report 0/10 pain at rest and during ADLs  3. Roma Goltz will demonstrate 5/5 LE strength on manual muscle testing  4. Roma Goltz will be able to perform SLS >5 seconds bilaterally to help with gait and improve balance  Rehabilitation Potential For Stated Goals: Good  Regarding Antonette Liz's therapy, I certify that the treatment plan above will be carried out by a therapist or under their direction.   Thank you for this referral,  Keon Funes, PT     Referring Physician Signature: Lamar Ramirez*              Date                    HISTORY:    History of Present Injury/Illness (Reason for Referral):  Ms. Puma Mcgee reports getting her foot caught between boxes in a freezer at work and falling backwards into a metal rack and boxes on 11/10/2016. She reports immediate pain at that time that has continued and is increased with walking, getting up from the bed, up from a chair, and with sitting greater than 15 minutes. She reports that her pain is right sided and radiates into the right foot. She also reports that she has difficulty with picking any bending, twisting, and lifting. MRI imaging demonstrated no discal injury and no significant structural damage to the lumbar tissue. Patient goals are to return to work and to be safe with back movement for prevention of re-injury or further injury. Patient reports that symptoms have not changed since injury. Past Medical History/Comorbidities:   Ms. Puma Mcgee  has a past medical history of Hypothyroid. Ms. Puma cMgee  has a past surgical history that includes hysterectomy and cholecystectomy. Social History/Living Environment:     lives in private residence with family  Prior Level of Function/Work/Activity:  Patient worked and was independent with all home and work duties prior to injury  Note: Patient denies any increase of symptoms with cough, sneeze or valsalva. Patient denies any saddle paresthesia or bowel/bladder deficits. Personal Factors:          Coping Style:  Seems to catastrophize injury        Social Background:  Some indications of family stress        Profession:          Past/Current Experience:  No previous history of back pain        Other factors that influence how disability is experienced by the patient:  Previous high level family stress due to loss of spouse in traumatic event.   Current Medications:    Current Outpatient Prescriptions:     diclofenac EC (VOLTAREN) 75 mg EC tablet, Take 1 Tab by mouth two (2) times a day., Disp: 10 Tab, Rfl: 0    levothyroxine (SYNTHROID) 75 mcg tablet, Take  by mouth Daily (before breakfast). , Disp: , Rfl:    Flexeril  norco  Ibeuprophen    Date Last Reviewed:  3/14/2017    Number of Personal Factors/Comorbidities that affect the Plan of Care:    EXAMINATION:    Observation/Orthostatic Postural Assessment:          Antalgic gait pattern with decreased right LE loading during gait. Right trunk lean in standing. Palpation:          Elevated right psis in standing and sitting. Tenderness with palpation of right quadratus lumborum and right piriformis  ROM:            AROM/PROM         Joint: Eval Date: 11/13/2106 Re-Assess Date:  Re-Assess Date:     RIGHT LEFT RIGHT LEFT RIGHT LEFT   Knee Extension         Knee Flexion         Hip Flexion         Hip Abduction         Lumbar Flexion 50% with Pain        Lumbar Extension 50% with pain        Lumbar Side-bending 50% with pain 50%with pain       Lumbar Rotation           Strength:    Joint: Eval Date:  Re-Assess Date:  Re-Assess Date:     RIGHT LEFT RIGHT LEFT RIGHT LEFT   Knee Flexion 5/5 5/5       Knee Extension 5/5 5/5       Hip Flexion 4/5 5/5       Hip Abduction 4/5 5/5       Ankle Dorsiflexion 5/5 5/5                           Single leg stance right/left: needed support on right              Deep squat: unable due to Right LE pain and c/o weakness    Ham 90/90:   RIGHT LE: -40   LEFT LE: -20    Special Tests: Assessed @ Initial Visit    ISRRAEL 4: + on right   SLUMP TEST: +on right  Neurological Screen: Assessed @ Initial Visit    RADIATING SYMPTOMS?: YES  Functional Mobility:  Assessed @ Initial Visit Affecting participation in basic ADLs and functional tasks.   Poor spinal mechanics with supine-sit, sit-stand, and picking up objects that demonstrated increased spinal flexion and discal loading    Balance:          Poor stability on right LE    Body Structures Involved:  1. Nerves  2. Joints  3. Muscles  4. Ligaments 5. Body Functions Affected:  1. Mental  2. Sensory/Pain  3. Neuromusculoskeletal  4. Movement Related  5. postural awareness Activities and Participation Affected:  1. General Tasks and Demands  2. Mobility  3. Self Care  4. Domestic Life  5. Interpersonal Interactions and Relationships  6. Community, Social and [place]  7. work duties   Number of elements that affect the Plan of Care:    CLINICAL PRESENTATION:    Presentation:  Evolving clinical presentation with changing clinical characteristics: MODERATE COMPLEXITY   CLINICAL DECISION MAKING:    Outcome Measure: Tool Used: Modified Oswestry Low Back Pain Questionnaire  Score:  Initial: 35/50  Most Recent: 32/50 (Date: -- )   Interpretation of Score: Each section is scored on a 0-5 scale, 5 representing the greatest disability. The scores of each section are added together for a total score of 50. Score 0 1-10 11-20 21-30 31-40 41-49 50   Modifier CH CI CJ CK CL CM CN       Medical Necessity:   · Skilled intervention continues to be required due to above deficits affecting participation in basic ADLs and overall functional tolerance. Reason for Services/Other Comments:  · Patient continues to require skilled intervention due to  above deficits affecting participation in basic ADLs and overall functional tolerance. ·    Use of outcome tool(s) and clinical judgement create a POC that gives a:    TREATMENT:    (In addition to Assessment/Re-Assessment sessions the following treatments were rendered)  THERAPEUTIC EXERCISE: (15 minutes):  Exercises per grid below to improve mobility, strength and balance. Required minimal visual and verbal cues to promote proper body alignment and promote proper body posture. Progressed resistance, range and complexity of movement as indicated.      Date:  3/14/2017   Activity/Exercise    Patient education on use of SI belt        Patient education on body mechanics Review for work duties x5'    physiostep Level 2   x 10 minutes with moist heat to  back   Lower trunk rotation    Supine piriformis stretch    sb supine walkouts    Pelvic clocks on exercise ball    Balance board    Tandem stance    Patient education on self SI mobilization    Walking lunge stretch    Patient education on piriformis and plantar foot soft tissue mobilization with tennis ball    Side steps/monster walks    Theraband in neutral pelvic position with core engaged with back to band push outs    sb mary anne pose    Supine nerve glides    Single LE cone tap with TVA contraction          MANUAL THERAPY: (30 minutes): Joint mobilization to right sacroiliac junction, Soft tissue mobilization and Manipulation was utilized and necessary because of the patient's restricted joint motion, painful spasm, restricted motion of soft tissue. Soft tissue mobilization to right thoracolumbar paraspinals, quadratus area, latissimus and right piriformis. Right LE distraction manipulation for improved right hip abduction muscle firing. (Used abbreviations: MET - muscle energy technique; PNF - proprioceptive neuromuscular facilitation; NMR - neuromuscular re-education; AP - anterior to posterior; PA - posterior to anterior)    MODALITIES: (0 minutes):      *  Cold Pack Therapy in order to provide analgesia, relieve muscle spasm and reduce inflammation and edema. With patient supine with knee wedge in place and ice pack on right lumbar area and wrapped around right pelvic area to cover tender and painful areas. No abnormal skin reaction present. Pre-treatment report: Patient reports that she has worked extended hours over the past week with increased lifting duties and some increased right gluteal fatigue  Treatment/Session Assessment:  Patient tolerated treatment well with improved lumbar paraspinal tone and soft tissue mobility after treatment. Improved pain with gait after treatment.   · Pain/ Symptoms: Initial: 5/10 Post Session: 2/10 ·   Compliance with Program/Exercises: Will assess as treatment progresses. · Recommendations/Intent for next treatment session: \"Next visit will focus on advancements to more challenging activities\".   Total Treatment Duration:  PT Patient Time In/Time Out  Time In: 1345  Time Out: [address], PT

## 2017-03-15 ENCOUNTER — HOSPITAL ENCOUNTER (OUTPATIENT)
Dept: PHYSICAL THERAPY | Age: 42
Discharge: HOME | End: 2017-03-15
Payer: COMMERCIAL

## 2017-03-15 PROCEDURE — 97110 THERAPEUTIC EXERCISES: CPT

## 2017-03-15 NOTE — PROGRESS NOTES
Oziel Quiroga  : 1975 [facility] Center at [address]  [phone]   [phone]         OUTPATIENT PHYSICAL THERAPY:Daily Note 3/15/2017      ICD-10: Treatment Diagnosis:   Lumbago with sciatica, right side (M54.41)       Precautions/Allergies:   Tramadol   Fall Risk Score: 7 (? 5 = High Risk)  MD Orders: Eval and Treat  MEDICAL/REFERRING DIAGNOSIS:  Lumbar radiculopathy [M54.16]   DATE OF ONSET: fall on 11/10/2016  REFERRING PHYSICIAN: Melissa [address] [place] [address] Avenue: 2017     INITIAL ASSESSMENT:  Ms. Oziel Quiroga has attended 1 physical therapy session including initial evaluation. Oziel Quiroga exhibits decreased flexibility, increased pain, decreased postural and core strength, decreased functional tolerance. Patient demonstrates pelvic malalignment upon initial evaluation with decreased posture and impaired transfer tolerance. Oziel Quiroga will benefit from skilled PT (medically necessary) to address above deficits affecting participation in basic ADLs and overall functional tolerance. Manual techniques (stretching, joint mobilizations, soft tissue mobilization/myofascial release), postural exercises/education, therapeutic techniques/activities, and HEP will be performed as appropriate addressing Antonette Liz's current condition. PROBLEM LIST (Impacting functional limitations):  1. Decreased Strength  2. Decreased ADL/Functional Activities  3. Decreased Transfer Abilities  4. Decreased Ambulation Ability/Technique  5. Decreased Balance  6. Increased Pain  7. Decreased Activity Tolerance  8. Decreased Work Simplification/Energy Conservation Techniques  9. Increased Fatigue  10. Decreased Flexibility/Joint Mobility  11. Decreased Knowledge of Precautions  12. Decreased [place] with Home Exercise Program  13.  impaired body mechanics INTERVENTIONS PLANNED:  1. Balance Exercise  2. Bed Mobility  3. Cold  4. Cryotherapy  5. Electrical Stimulation  6. Family Education  7. Gait Training  8. Heat  9. Home Exercise Program (HEP)  10. Manual Therapy  11. Neuromuscular Re-education/Strengthening  12. Range of Motion (ROM)  13. Therapeutic Activites  14. Therapeutic Exercise/Strengthening  15. Transfer Training   TREATMENT PLAN:  Effective Dates: 1/13/2017 TO 4/13/2017. Frequency/Duration: 2 times a week for 8 weeks  GOALS: (Goals have been discussed and agreed upon with patient.)  Short Term Goals 4 weeks   1. Paulino Tolentino will be independent with HEP  2. Paulino Tolentino will participate in LE stretching program to increase flexibility  3. Paulino Tolentino will participate in core stabilization exercises to help with stabilization during ADLs  4. Paulino Tolentino will participate in LE strengthening program with weights as appropriate to help with gait and elevations  5. Paulino Tolentino will participate in static and dynamic balance activities to decrease the risk for falls  6. Paulino Tolentino will tolerate manual therapy/joint mobilizations/soft tissue to increase ROM and decrease pain  7. Patient will demonstrate safe body mechanics with lifting, bending, and transfers for improved lumbar spine pain and activity tolerance. Long Term Goals 8 weeks   1. Paulino Tolentino will demonstrate a 20 point improvement on the Oswestry to show improvement in function  2. Paulino Tloentino will report 0/10 pain at rest and during ADLs  3. Paulino Tolentino will demonstrate 5/5 LE strength on manual muscle testing  4. Paulino Tolentino will be able to perform SLS >5 seconds bilaterally to help with gait and improve balance  Rehabilitation Potential For Stated Goals: Good  Regarding Antonette Liz's therapy, I certify that the treatment plan above will be carried out by a therapist or under their direction.   Thank you for this referral,  Gisela Abrams, PT     Referring Physician Signature: Thomas Rdz*              Date                    HISTORY:    History of Present Injury/Illness (Reason for Referral):  Ms. Chester Jones reports getting her foot caught between boxes in a freezer at work and falling backwards into a metal rack and boxes on 11/10/2016. She reports immediate pain at that time that has continued and is increased with walking, getting up from the bed, up from a chair, and with sitting greater than 15 minutes. She reports that her pain is right sided and radiates into the right foot. She also reports that she has difficulty with picking any bending, twisting, and lifting. MRI imaging demonstrated no discal injury and no significant structural damage to the lumbar tissue. Patient goals are to return to work and to be safe with back movement for prevention of re-injury or further injury. Patient reports that symptoms have not changed since injury. Past Medical History/Comorbidities:   Ms. Chester Jones  has a past medical history of Hypothyroid. Ms. Chester Jones  has a past surgical history that includes hysterectomy and cholecystectomy. Social History/Living Environment:     lives in private residence with family  Prior Level of Function/Work/Activity:  Patient worked and was independent with all home and work duties prior to injury  Note: Patient denies any increase of symptoms with cough, sneeze or valsalva. Patient denies any saddle paresthesia or bowel/bladder deficits. Personal Factors:          Coping Style:  Seems to catastrophize injury        Social Background:  Some indications of family stress        Profession:          Past/Current Experience:  No previous history of back pain        Other factors that influence how disability is experienced by the patient:  Previous high level family stress due to loss of spouse in traumatic event.   Current Medications:    Current Outpatient Prescriptions:     diclofenac EC (VOLTAREN) 75 mg EC tablet, Take 1 Tab by mouth two (2) times a day., Disp: 10 Tab, Rfl: 0    levothyroxine (SYNTHROID) 75 mcg tablet, Take  by mouth Daily (before breakfast). , Disp: , Rfl:    Flexeril  norco  Ibeuprophen    Date Last Reviewed:  3/15/2017    Number of Personal Factors/Comorbidities that affect the Plan of Care:    EXAMINATION:    Observation/Orthostatic Postural Assessment:          Antalgic gait pattern with decreased right LE loading during gait. Right trunk lean in standing. Palpation:          Elevated right psis in standing and sitting. Tenderness with palpation of right quadratus lumborum and right piriformis  ROM:            AROM/PROM         Joint: Eval Date: 11/13/2106 Re-Assess Date:  Re-Assess Date:     RIGHT LEFT RIGHT LEFT RIGHT LEFT   Knee Extension         Knee Flexion         Hip Flexion         Hip Abduction         Lumbar Flexion 50% with Pain        Lumbar Extension 50% with pain        Lumbar Side-bending 50% with pain 50%with pain       Lumbar Rotation           Strength:    Joint: Eval Date:  Re-Assess Date:  Re-Assess Date:     RIGHT LEFT RIGHT LEFT RIGHT LEFT   Knee Flexion 5/5 5/5       Knee Extension 5/5 5/5       Hip Flexion 4/5 5/5       Hip Abduction 4/5 5/5       Ankle Dorsiflexion 5/5 5/5                           Single leg stance right/left: needed support on right              Deep squat: unable due to Right LE pain and c/o weakness    Ham 90/90:   RIGHT LE: -40   LEFT LE: -20    Special Tests: Assessed @ Initial Visit    ISRRAEL 4: + on right   SLUMP TEST: +on right  Neurological Screen: Assessed @ Initial Visit    RADIATING SYMPTOMS?: YES  Functional Mobility:  Assessed @ Initial Visit Affecting participation in basic ADLs and functional tasks.   Poor spinal mechanics with supine-sit, sit-stand, and picking up objects that demonstrated increased spinal flexion and discal loading    Balance:          Poor stability on right LE    Body Structures Involved:  1. Nerves  2. Joints  3. Muscles  4. Ligaments 5. Body Functions Affected:  1. Mental  2. Sensory/Pain  3. Neuromusculoskeletal  4. Movement Related  5. postural awareness Activities and Participation Affected:  1. General Tasks and Demands  2. Mobility  3. Self Care  4. Domestic Life  5. Interpersonal Interactions and Relationships  6. Community, Social and [place]  7. work duties   Number of elements that affect the Plan of Care:    CLINICAL PRESENTATION:    Presentation:  Evolving clinical presentation with changing clinical characteristics: MODERATE COMPLEXITY   CLINICAL DECISION MAKING:    Outcome Measure: Tool Used: Modified Oswestry Low Back Pain Questionnaire  Score:  Initial: 35/50  Most Recent: 32/50 (Date: -- )   Interpretation of Score: Each section is scored on a 0-5 scale, 5 representing the greatest disability. The scores of each section are added together for a total score of 50. Score 0 1-10 11-20 21-30 31-40 41-49 50   Modifier CH CI CJ CK CL CM CN       Medical Necessity:   · Skilled intervention continues to be required due to above deficits affecting participation in basic ADLs and overall functional tolerance. Reason for Services/Other Comments:  · Patient continues to require skilled intervention due to  above deficits affecting participation in basic ADLs and overall functional tolerance. ·    Use of outcome tool(s) and clinical judgement create a POC that gives a:    TREATMENT:    (In addition to Assessment/Re-Assessment sessions the following treatments were rendered)  THERAPEUTIC EXERCISE: (45 minutes):  Exercises per grid below to improve mobility, strength and balance. Required minimal visual and verbal cues to promote proper body alignment and promote proper body posture. Progressed resistance, range and complexity of movement as indicated.      Date:  3/15/2017   Activity/Exercise            Patient education on body mechanics Review for work duties x5' physiostep Level 2   x 10 minutes with moist heat to  back   Lower trunk rotation    Supine piriformis stretch 2x1'B   sb supine walkouts    Pelvic clocks on exercise ball    Balance board    Tandem stance    Supine ITB stretch 2x1'B   Walking lunge stretch    SLS on A/x 3x30\"   Side steps/monster walks 3x20 feet each   Bridges with add/abd 3x10ea   sb mary anne pose    Supine nerve glides    Single LE cone tap with TVA contraction          MANUAL THERAPY: (0 minutes): Joint mobilization to right sacroiliac junction, Soft tissue mobilization and Manipulation was utilized and necessary because of the patient's restricted joint motion, painful spasm, restricted motion of soft tissue. Soft tissue mobilization to right thoracolumbar paraspinals, quadratus area, latissimus and right piriformis. Right LE distraction manipulation for improved right hip abduction muscle firing. (Used abbreviations: MET - muscle energy technique; PNF - proprioceptive neuromuscular facilitation; NMR - neuromuscular re-education; AP - anterior to posterior; PA - posterior to anterior)    MODALITIES: (0 minutes):      *  Cold Pack Therapy in order to provide analgesia, relieve muscle spasm and reduce inflammation and edema. With patient supine with knee wedge in place and ice pack on right lumbar area and wrapped around right pelvic area to cover tender and painful areas. No abnormal skin reaction present. Pre-treatment report: Patient reports fatigue today after extended work hours. Treatment/Session Assessment:  Patient tolerated treatment well with improved lumbar paraspinal tone and soft tissue mobility after treatment. Improved pain with gait after treatment. Cueing for improved core positioning during bridging and balance activities. · Pain/ Symptoms: Initial:  5/10 Post Session: 3/10 ·   Compliance with Program/Exercises: Will assess as treatment progresses. · Recommendations/Intent for next treatment session:  \"Next visit will focus on advancements to more challenging activities\".   Total Treatment Duration:  PT Patient Time In/Time Out  Time In: 1300  Time Out: [place], PT

## 2017-03-15 NOTE — PROGRESS NOTES
Please send a recent progress  Summary with objective measurements. Please fax or email: [phone]  or [email or web address]     Patient Progress Note 3/15/2017 (needed every 2 weeks)    Provider Name:  [address] [place] [address] NICOLETTE CHAO Patient's Name:  Leoncio Iverson   [id number] Date:  3/15/2017 Claim #: [id number]   :  1975 Surgery Date:    Eval Date: 2017 Service: Physical Therapy   Diagnosis: Lumbago, sciatica right side       AROM/PROM     STRENGTH      Joint:   Eval Date:  2017 Re-Assess:  2/15/2017 Re-Assess:  3/14/2017  Joint: Noa Ghotra Date:  2017 Re-Assess:  2/15/2017 Re-Assess:  3/14/2017    All joints WNL with pain at end range right hip IR and ER All joints WNL with pain at end range right hip IR and ER All joints WNL with pain at end range right hip IR and ER  Hip flexion R 4 with pain 4+ with pain 5 with pain        Hip abduction R 4 with pain 4+ with pain 4+ with pain        Hip extension R 4 with pain 4+ with pain 5 with pain              PAIN LEVEL 8 7 5          Progress toward work related goals:  progressing   Remaining Deficits:  Pain with standing   New Goals:  Continue with IE goals   Summary of treatment provided:  Core stabilization exercise, functional lifting mechanics with graded load, body mechanics and postural training for safety with work duties. Co-morbidities slowing progress: []Diabetes/Metabolic Disease   []Infection   []Scarring   []Stroke    []Auto-Immune Diesease   []Morbid Obesity   []Heart Disease   []Pregnancy   []Osteoporosis    Recommendations: (use .alignrecommendations to refresh this section)  [x] Continue Current POC with current authorization; 3 Visits remaining  [] Recommend Continued therapy  2  x wk for  4  wks  [] Discharge    % Goals Met  [] D/C - poor compliance  [] D/C - plateau - f/u MD  Plan for D/C < 2 wks [x] 2-4 wks [] 4-6 wks [] >6 wks [] D/C expected w/in requested auth?   YES     Therapist Name:  Zachery Marie, PT   Signature: ______________________________  Date:  3/15/2017   If Provider recommending additional therapy, provider must obtain MD RX and submit to [address].   [id number]:        Page 1 of 1

## 2017-03-22 ENCOUNTER — HOSPITAL ENCOUNTER (OUTPATIENT)
Dept: PHYSICAL THERAPY | Age: 42
Discharge: HOME | End: 2017-03-22
Payer: COMMERCIAL

## 2017-03-22 PROCEDURE — 97110 THERAPEUTIC EXERCISES: CPT
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
## 2017-03-24 ENCOUNTER — APPOINTMENT (OUTPATIENT)
Dept: PHYSICAL THERAPY | Age: 42
End: 2017-03-24

## 2017-04-10 ENCOUNTER — HOSPITAL ENCOUNTER (OUTPATIENT)
Dept: PHYSICAL THERAPY | Age: 42
Discharge: HOME | End: 2017-04-10
Payer: COMMERCIAL

## 2017-04-10 PROCEDURE — 97110 THERAPEUTIC EXERCISES: CPT

## 2017-04-10 NOTE — PROGRESS NOTES
Nohemy Anderson  : 1975 [facility] at [facility]  [address]  [phone]   [phone]         OUTPATIENT PHYSICAL THERAPY:Daily Note 4/10/2017      ICD-10: Treatment Diagnosis:   Lumbago with sciatica, right side (M54.41)       Precautions/Allergies:   Tramadol   Fall Risk Score: 7 (? 5 = High Risk)  MD Orders: Eval and Treat  MEDICAL/REFERRING DIAGNOSIS:  Lumbar radiculopathy [M54.16]   DATE OF ONSET: fall on 11/10/2016  REFERRING PHYSICIAN: Melissa [address] [place] [address] Avenue: 2017     INITIAL ASSESSMENT:  Ms. Nohemy Anderson has attended 1 physical therapy session including initial evaluation. Nohemy Anderson exhibits decreased flexibility, increased pain, decreased postural and core strength, decreased functional tolerance. Patient demonstrates pelvic malalignment upon initial evaluation with decreased posture and impaired transfer tolerance. Nohemy Anderson will benefit from skilled PT (medically necessary) to address above deficits affecting participation in basic ADLs and overall functional tolerance. Manual techniques (stretching, joint mobilizations, soft tissue mobilization/myofascial release), postural exercises/education, therapeutic techniques/activities, and HEP will be performed as appropriate addressing Antonette Liz's current condition. PROBLEM LIST (Impacting functional limitations):  1. Decreased Strength  2. Decreased ADL/Functional Activities  3. Decreased Transfer Abilities  4. Decreased Ambulation Ability/Technique  5. Decreased Balance  6. Increased Pain  7. Decreased Activity Tolerance  8. Decreased Work Simplification/Energy Conservation Techniques  9. Increased Fatigue  10. Decreased Flexibility/Joint Mobility  11. Decreased Knowledge of Precautions  12. Decreased [place] with Home Exercise Program  13.  impaired body mechanics INTERVENTIONS PLANNED:  1. Balance Exercise  2. Bed Mobility  3. Cold  4. Cryotherapy  5. Electrical Stimulation  6. Family Education  7. Gait Training  8. Heat  9. Home Exercise Program (HEP)  10. Manual Therapy  11. Neuromuscular Re-education/Strengthening  12. Range of Motion (ROM)  13. Therapeutic Activites  14. Therapeutic Exercise/Strengthening  15. Transfer Training   TREATMENT PLAN:  Effective Dates: 1/13/2017 TO 4/13/2017. Frequency/Duration: 2 times a week for 8 weeks  GOALS: (Goals have been discussed and agreed upon with patient.)  Short Term Goals 4 weeks   1. Pham Molina will be independent with HEP  2. Orkirsten Milanro will participate in LE stretching program to increase flexibility  3. Orkirsten iMlanro will participate in core stabilization exercises to help with stabilization during ADLs  4. Orkirsten Milanro will participate in LE strengthening program with weights as appropriate to help with gait and elevations  5. Orkirsten Milanro will participate in static and dynamic balance activities to decrease the risk for falls  6. Orkirsten Milanro will tolerate manual therapy/joint mobilizations/soft tissue to increase ROM and decrease pain  7. Patient will demonstrate safe body mechanics with lifting, bending, and transfers for improved lumbar spine pain and activity tolerance. Long Term Goals 8 weeks   1. Orkirsten Milanro will demonstrate a 20 point improvement on the Oswestry to show improvement in function  2. Orkirsten Milanro will report 0/10 pain at rest and during ADLs  3. Orkirsten Milanro will demonstrate 5/5 LE strength on manual muscle testing  4. Orkirsten Milanro will be able to perform SLS >5 seconds bilaterally to help with gait and improve balance  Rehabilitation Potential For Stated Goals: Good  Regarding Antonette Liz's therapy, I certify that the treatment plan above will be carried out by a therapist or under their direction.   Thank you for this referral,  Boston Adan, PT     Referring Physician Signature: Dillonmanny Shepard*              Date                    HISTORY:    History of Present Injury/Illness (Reason for Referral):  Ms. Dafne Andrade reports getting her foot caught between boxes in a freezer at work and falling backwards into a metal rack and boxes on 11/10/2016. She reports immediate pain at that time that has continued and is increased with walking, getting up from the bed, up from a chair, and with sitting greater than 15 minutes. She reports that her pain is right sided and radiates into the right foot. She also reports that she has difficulty with picking any bending, twisting, and lifting. MRI imaging demonstrated no discal injury and no significant structural damage to the lumbar tissue. Patient goals are to return to work and to be safe with back movement for prevention of re-injury or further injury. Patient reports that symptoms have not changed since injury. Past Medical History/Comorbidities:   Ms. Dafne Andrade  has a past medical history of Hypothyroid. Ms. Dafne Andrade  has a past surgical history that includes hysterectomy and cholecystectomy. Social History/Living Environment:     lives in private residence with family  Prior Level of Function/Work/Activity:  Patient worked and was independent with all home and work duties prior to injury  Note: Patient denies any increase of symptoms with cough, sneeze or valsalva. Patient denies any saddle paresthesia or bowel/bladder deficits. Personal Factors:          Coping Style:  Seems to catastrophize injury        Social Background:  Some indications of family stress        Profession:          Past/Current Experience:  No previous history of back pain        Other factors that influence how disability is experienced by the patient:  Previous high level family stress due to loss of spouse in traumatic event.   Current Medications:    Current Outpatient Prescriptions:     diclofenac EC (VOLTAREN) 75 mg EC tablet, Take 1 Tab by mouth two (2) times a day., Disp: 10 Tab, Rfl: 0    levothyroxine (SYNTHROID) 75 mcg tablet, Take  by mouth Daily (before breakfast). , Disp: , Rfl:    Flexeril  norco  Ibeuprophen    Date Last Reviewed:  4/10/2017    Number of Personal Factors/Comorbidities that affect the Plan of Care:    EXAMINATION:    Observation/Orthostatic Postural Assessment:          Antalgic gait pattern with decreased right LE loading during gait. Right trunk lean in standing. Palpation:          Elevated right psis in standing and sitting. Tenderness with palpation of right quadratus lumborum and right piriformis  ROM:            AROM/PROM         Joint: Eval Date: 11/13/2106 Re-Assess Date:  Re-Assess Date:     RIGHT LEFT RIGHT LEFT RIGHT LEFT   Knee Extension         Knee Flexion         Hip Flexion         Hip Abduction         Lumbar Flexion 50% with Pain        Lumbar Extension 50% with pain        Lumbar Side-bending 50% with pain 50%with pain       Lumbar Rotation           Strength:    Joint: Eval Date:  Re-Assess Date:  Re-Assess Date:     RIGHT LEFT RIGHT LEFT RIGHT LEFT   Knee Flexion 5/5 5/5       Knee Extension 5/5 5/5       Hip Flexion 4/5 5/5       Hip Abduction 4/5 5/5       Ankle Dorsiflexion 5/5 5/5                           Single leg stance right/left: needed support on right              Deep squat: unable due to Right LE pain and c/o weakness    Ham 90/90:   RIGHT LE: -40   LEFT LE: -20    Special Tests: Assessed @ Initial Visit    ISRRAEL 4: + on right   SLUMP TEST: +on right  Neurological Screen: Assessed @ Initial Visit    RADIATING SYMPTOMS?: YES  Functional Mobility:  Assessed @ Initial Visit Affecting participation in basic ADLs and functional tasks.   Poor spinal mechanics with supine-sit, sit-stand, and picking up objects that demonstrated increased spinal flexion and discal loading    Balance:          Poor stability on right LE    Body Structures Involved:  1. Nerves  2. Joints  3. Muscles  4. Ligaments 5. Body Functions Affected:  1. Mental  2. Sensory/Pain  3. Neuromusculoskeletal  4. Movement Related  5. postural awareness Activities and Participation Affected:  1. General Tasks and Demands  2. Mobility  3. Self Care  4. Domestic Life  5. Interpersonal Interactions and Relationships  6. Community, Social and [place]  7. work duties   Number of elements that affect the Plan of Care:    CLINICAL PRESENTATION:    Presentation:  Evolving clinical presentation with changing clinical characteristics: MODERATE COMPLEXITY   CLINICAL DECISION MAKING:    Outcome Measure: Tool Used: Modified Oswestry Low Back Pain Questionnaire  Score:  Initial: 35/50  Most Recent: 32/50 (Date: -- )   Interpretation of Score: Each section is scored on a 0-5 scale, 5 representing the greatest disability. The scores of each section are added together for a total score of 50. Score 0 1-10 11-20 21-30 31-40 41-49 50   Modifier CH CI CJ CK CL CM CN       Medical Necessity:   · Skilled intervention continues to be required due to above deficits affecting participation in basic ADLs and overall functional tolerance. Reason for Services/Other Comments:  · Patient continues to require skilled intervention due to  above deficits affecting participation in basic ADLs and overall functional tolerance. ·    Use of outcome tool(s) and clinical judgement create a POC that gives a:    TREATMENT:    (In addition to Assessment/Re-Assessment sessions the following treatments were rendered)  THERAPEUTIC EXERCISE: (40 minutes):  Exercises per grid below to improve mobility, strength and balance. Required minimal visual and verbal cues to promote proper body alignment and promote proper body posture. Progressed resistance, range and complexity of movement as indicated.      Date:  4/10/2017   Activity/Exercise            Patient education on body mechanics Review for work duties x5' physiostep Level 2   x 10 minutes with moist heat to  back   Lower trunk rotation    Supine piriformis stretch 2x1'B   Foam rolling x20ea  -thoracic spine  -itb bilaterally  -gluts B  -adductors B   SB supine walkouts 2x10   Balance board M/l x 2'  Tandem stance 2x1'ea    Tandem balance on a/x 2x1'ea way   Supine ITB stretch    Walking lunge stretch    SLS on A/x 3x30\"   Side steps/monster walks 3x20 feet each   Bridges with add/abd    sb mary anne pose    Supine nerve glides    Single LE cone tap with TVA contraction          MANUAL THERAPY: (0 minutes): Joint mobilization to right sacroiliac junction, Soft tissue mobilization and Manipulation was utilized and necessary because of the patient's restricted joint motion, painful spasm, restricted motion of soft tissue. Soft tissue mobilization to right thoracolumbar paraspinals, quadratus area, latissimus and right piriformis. Right LE distraction manipulation for improved right hip abduction muscle firing. (Used abbreviations: MET - muscle energy technique; PNF - proprioceptive neuromuscular facilitation; NMR - neuromuscular re-education; AP - anterior to posterior; PA - posterior to anterior)    MODALITIES: (0 minutes):      *  Cold Pack Therapy in order to provide analgesia, relieve muscle spasm and reduce inflammation and edema. With patient supine with knee wedge in place and ice pack on right lumbar area and wrapped around right pelvic area to cover tender and painful areas. No abnormal skin reaction present. Pre-treatment report: Patient reports improved general fatigue. Reports that she is gradually improving well. Treatment/Session Assessment:  Patient tolerated treatment well with improved lumbar paraspinal tone and soft tissue mobility after treatment. Improved pain with gait after treatment. Cueing for improved core positioning during bridging and balance activities.   · Pain/ Symptoms: Initial:  4/10 Post Session: 2/10 ·   Compliance with Program/Exercises: Will assess as treatment progresses. · Recommendations/Intent for next treatment session: \"Next visit will focus on advancements to more challenging activities\".   Total Treatment Duration:  PT Patient Time In/Time Out  Time In: 1615  Time Out: 1160 [facility], PT

## 2017-04-18 ENCOUNTER — HOSPITAL ENCOUNTER (OUTPATIENT)
Dept: PHYSICAL THERAPY | Age: 42
Discharge: HOME | End: 2017-04-18
Payer: COMMERCIAL

## 2017-04-18 PROCEDURE — 97110 THERAPEUTIC EXERCISES: CPT

## 2017-04-18 PROCEDURE — 97140 MANUAL THERAPY 1/> REGIONS: CPT

## 2017-04-18 NOTE — PROGRESS NOTES
Darius Schafer  : 1975 [facility] at [place] [address] [facility]  [phone]   [phone]         OUTPATIENT PHYSICAL THERAPY:Daily Note 2017      ICD-10: Treatment Diagnosis:   Lumbago with sciatica, right side (M54.41)       Precautions/Allergies:   Tramadol   Fall Risk Score: 7 (? 5 = High Risk)  MD Orders: Eval and Treat  MEDICAL/REFERRING DIAGNOSIS:  Lumbar radiculopathy [M54.16]   DATE OF ONSET: fall on 11/10/2016  REFERRING PHYSICIAN: Ganga Torres [place] [address]: 2017     INITIAL ASSESSMENT:  Ms. Darius Schafer has attended 1 physical therapy session including initial evaluation. Darius Schafer exhibits decreased flexibility, increased pain, decreased postural and core strength, decreased functional tolerance. Patient demonstrates pelvic malalignment upon initial evaluation with decreased posture and impaired transfer tolerance. Darius Schafer will benefit from skilled PT (medically necessary) to address above deficits affecting participation in basic ADLs and overall functional tolerance. Manual techniques (stretching, joint mobilizations, soft tissue mobilization/myofascial release), postural exercises/education, therapeutic techniques/activities, and HEP will be performed as appropriate addressing Antonette Liz's current condition. PROBLEM LIST (Impacting functional limitations):  1. Decreased Strength  2. Decreased ADL/Functional Activities  3. Decreased Transfer Abilities  4. Decreased Ambulation Ability/Technique  5. Decreased Balance  6. Increased Pain  7. Decreased Activity Tolerance  8. Decreased Work Simplification/Energy Conservation Techniques  9. Increased Fatigue  10. Decreased Flexibility/Joint Mobility  11. Decreased Knowledge of Precautions  12. Decreased [place] with Home Exercise Program  13.  impaired body mechanics INTERVENTIONS PLANNED:  1. Balance Exercise  2. Bed Mobility  3. Cold  4. Cryotherapy  5. Electrical Stimulation  6. Family Education  7. Gait Training  8. Heat  9. Home Exercise Program (HEP)  10. Manual Therapy  11. Neuromuscular Re-education/Strengthening  12. Range of Motion (ROM)  13. Therapeutic Activites  14. Therapeutic Exercise/Strengthening  15. Transfer Training   TREATMENT PLAN:  Effective Dates: 1/13/2017 TO 4/13/2017. Frequency/Duration: 2 times a week for 8 weeks  GOALS: (Goals have been discussed and agreed upon with patient.)  Short Term Goals 4 weeks   1. Chilo Lang will be independent with HEP  2. Chilo Lang will participate in LE stretching program to increase flexibility  3. Chilo Lang will participate in core stabilization exercises to help with stabilization during ADLs  4. Chilo Lang will participate in LE strengthening program with weights as appropriate to help with gait and elevations  5. Chilo Lang will participate in static and dynamic balance activities to decrease the risk for falls  6. Chilo Lang will tolerate manual therapy/joint mobilizations/soft tissue to increase ROM and decrease pain  7. Patient will demonstrate safe body mechanics with lifting, bending, and transfers for improved lumbar spine pain and activity tolerance. Long Term Goals 8 weeks   1. Chilo Lang will demonstrate a 20 point improvement on the Oswestry to show improvement in function  2. Chilo Lang will report 0/10 pain at rest and during ADLs  3. Chilo Lang will demonstrate 5/5 LE strength on manual muscle testing  4. Chilo Lang will be able to perform SLS >5 seconds bilaterally to help with gait and improve balance  Rehabilitation Potential For Stated Goals: Good  Regarding Antonette Liz's therapy, I certify that the treatment plan above will be carried out by a therapist or under their direction.   Thank you for this referral,  Sage Romero, PT     Referring Physician Signature: Nakul Momin*              Date                    HISTORY:    History of Present Injury/Illness (Reason for Referral):  Ms. Arben Baumann reports getting her foot caught between boxes in a freezer at work and falling backwards into a metal rack and boxes on 11/10/2016. She reports immediate pain at that time that has continued and is increased with walking, getting up from the bed, up from a chair, and with sitting greater than 15 minutes. She reports that her pain is right sided and radiates into the right foot. She also reports that she has difficulty with picking any bending, twisting, and lifting. MRI imaging demonstrated no discal injury and no significant structural damage to the lumbar tissue. Patient goals are to return to work and to be safe with back movement for prevention of re-injury or further injury. Patient reports that symptoms have not changed since injury. Past Medical History/Comorbidities:   Ms. Arben Baumann  has a past medical history of Hypothyroid. Ms. Arben Baumann  has a past surgical history that includes hysterectomy and cholecystectomy. Social History/Living Environment:     lives in private residence with family  Prior Level of Function/Work/Activity:  Patient worked and was independent with all home and work duties prior to injury  Note: Patient denies any increase of symptoms with cough, sneeze or valsalva. Patient denies any saddle paresthesia or bowel/bladder deficits. Personal Factors:          Coping Style:  Seems to catastrophize injury        Social Background:  Some indications of family stress        Profession:          Past/Current Experience:  No previous history of back pain        Other factors that influence how disability is experienced by the patient:  Previous high level family stress due to loss of spouse in traumatic event.   Current Medications:    Current Outpatient Prescriptions:     diclofenac EC (VOLTAREN) 75 mg EC tablet, Take 1 Tab by mouth two (2) times a day., Disp: 10 Tab, Rfl: 0    levothyroxine (SYNTHROID) 75 mcg tablet, Take  by mouth Daily (before breakfast). , Disp: , Rfl:    Flexeril  norco  Ibeuprophen    Date Last Reviewed:  4/24/2017    Number of Personal Factors/Comorbidities that affect the Plan of Care:    EXAMINATION:    Observation/Orthostatic Postural Assessment:          Antalgic gait pattern with decreased right LE loading during gait. Right trunk lean in standing. Palpation:          Elevated right psis in standing and sitting. Tenderness with palpation of right quadratus lumborum and right piriformis  ROM:            AROM/PROM         Joint: Eval Date: 11/13/2106 Re-Assess Date:  Re-Assess Date:     RIGHT LEFT RIGHT LEFT RIGHT LEFT   Knee Extension         Knee Flexion         Hip Flexion         Hip Abduction         Lumbar Flexion 50% with Pain        Lumbar Extension 50% with pain        Lumbar Side-bending 50% with pain 50%with pain       Lumbar Rotation           Strength:    Joint: Eval Date:  Re-Assess Date:  Re-Assess Date:     RIGHT LEFT RIGHT LEFT RIGHT LEFT   Knee Flexion 5/5 5/5       Knee Extension 5/5 5/5       Hip Flexion 4/5 5/5       Hip Abduction 4/5 5/5       Ankle Dorsiflexion 5/5 5/5                           Single leg stance right/left: needed support on right              Deep squat: unable due to Right LE pain and c/o weakness    Ham 90/90:   RIGHT LE: -40   LEFT LE: -20    Special Tests: Assessed @ Initial Visit    ISRRAEL 4: + on right   SLUMP TEST: +on right  Neurological Screen: Assessed @ Initial Visit    RADIATING SYMPTOMS?: YES  Functional Mobility:  Assessed @ Initial Visit Affecting participation in basic ADLs and functional tasks.   Poor spinal mechanics with supine-sit, sit-stand, and picking up objects that demonstrated increased spinal flexion and discal loading    Balance:          Poor stability on right LE    Body Structures Involved:  1. Nerves  2. Joints  3. Muscles  4. Ligaments 5. Body Functions Affected:  1. Mental  2. Sensory/Pain  3. Neuromusculoskeletal  4. Movement Related  5. postural awareness Activities and Participation Affected:  1. General Tasks and Demands  2. Mobility  3. Self Care  4. Domestic Life  5. Interpersonal Interactions and Relationships  6. Community, Social and [place]  7. work duties   Number of elements that affect the Plan of Care:    CLINICAL PRESENTATION:    Presentation:  Evolving clinical presentation with changing clinical characteristics: MODERATE COMPLEXITY   CLINICAL DECISION MAKING:    Outcome Measure: Tool Used: Modified Oswestry Low Back Pain Questionnaire  Score:  Initial: 35/50  Most Recent: 32/50 (Date: -- )   Interpretation of Score: Each section is scored on a 0-5 scale, 5 representing the greatest disability. The scores of each section are added together for a total score of 50. Score 0 1-10 11-20 21-30 31-40 41-49 50   Modifier CH CI CJ CK CL CM CN       Medical Necessity:   · Skilled intervention continues to be required due to above deficits affecting participation in basic ADLs and overall functional tolerance. Reason for Services/Other Comments:  · Patient continues to require skilled intervention due to  above deficits affecting participation in basic ADLs and overall functional tolerance. ·    Use of outcome tool(s) and clinical judgement create a POC that gives a:    TREATMENT:    (In addition to Assessment/Re-Assessment sessions the following treatments were rendered)  THERAPEUTIC EXERCISE: (40 minutes):  Exercises per grid below to improve mobility, strength and balance. Required minimal visual and verbal cues to promote proper body alignment and promote proper body posture. Progressed resistance, range and complexity of movement as indicated.      Date:  4/10/2017   Activity/Exercise            Patient education on body mechanics Review for work duties x5' physiostep Level 2   x 10 minutes with moist heat to  back   Lower trunk rotation    Supine piriformis stretch 2x1'B   Foam rolling x20ea  -thoracic spine  -itb bilaterally  -gluts B  -adductors B   SB supine walkouts 2x10   Balance board M/l x 2'  Tandem stance 2x1'ea    Tandem balance on a/x 2x1'ea way   Supine ITB stretch    Walking lunge stretch    SLS on A/x 3x30\"   Side steps/monster walks 3x20 feet each   Bridges with add/abd    sb mary anne pose    Supine nerve glides    Single LE cone tap with TVA contraction          MANUAL THERAPY: (0 minutes): Joint mobilization to right sacroiliac junction, Soft tissue mobilization and Manipulation was utilized and necessary because of the patient's restricted joint motion, painful spasm, restricted motion of soft tissue. Soft tissue mobilization to right thoracolumbar paraspinals, quadratus area, latissimus and right piriformis. Right LE distraction manipulation for improved right hip abduction muscle firing. (Used abbreviations: MET - muscle energy technique; PNF - proprioceptive neuromuscular facilitation; NMR - neuromuscular re-education; AP - anterior to posterior; PA - posterior to anterior)    MODALITIES: (0 minutes):      *  Cold Pack Therapy in order to provide analgesia, relieve muscle spasm and reduce inflammation and edema. With patient supine with knee wedge in place and ice pack on right lumbar area and wrapped around right pelvic area to cover tender and painful areas. No abnormal skin reaction present. Pre-treatment report: Patient reports improved general fatigue. Reports that she is gradually improving well. Treatment/Session Assessment:  Patient tolerated treatment well with improved lumbar paraspinal tone and soft tissue mobility after treatment. Improved pain with gait after treatment. Cueing for improved core positioning during bridging and balance activities.   · Pain/ Symptoms: Initial:  4/10 Post Session: 2/10 ·   Compliance with Program/Exercises: Will assess as treatment progresses. · Recommendations/Intent for next treatment session: \"Next visit will focus on advancements to more challenging activities\".   Total Treatment Duration:  PT Patient Time In/Time Out  Time In: 1610  Time Out: [id number] [facility],

## 2017-04-24 NOTE — PROGRESS NOTES
Bhavani Aceves  : 1975 [facility] [place] [address] [address] [facility]  [phone]   [phone]         OUTPATIENT PHYSICAL THERAPY:Daily Note 2017      ICD-10: Treatment Diagnosis:   Lumbago with sciatica, right side (M54.41)       Precautions/Allergies:   Tramadol   Fall Risk Score: 7 (? 5 = High Risk)  MD Orders: Eval and Treat  MEDICAL/REFERRING DIAGNOSIS:  Lumbar radiculopathy [M54.16]   DATE OF ONSET: fall on 11/10/2016  REFERRING PHYSICIAN: Melissa [address] [place] [address] Avenue: 2017     INITIAL ASSESSMENT:  Ms. Bhavani Aceves has attended 1 physical therapy session including initial evaluation. Bhavani Aceves exhibits decreased flexibility, increased pain, decreased postural and core strength, decreased functional tolerance. Patient demonstrates pelvic malalignment upon initial evaluation with decreased posture and impaired transfer tolerance. Bhavani Aceves will benefit from skilled PT (medically necessary) to address above deficits affecting participation in basic ADLs and overall functional tolerance. Manual techniques (stretching, joint mobilizations, soft tissue mobilization/myofascial release), postural exercises/education, therapeutic techniques/activities, and HEP will be performed as appropriate addressing Antonette Liz's current condition. PROBLEM LIST (Impacting functional limitations):  1. Decreased Strength  2. Decreased ADL/Functional Activities  3. Decreased Transfer Abilities  4. Decreased Ambulation Ability/Technique  5. Decreased Balance  6. Increased Pain  7. Decreased Activity Tolerance  8. Decreased Work Simplification/Energy Conservation Techniques  9. Increased Fatigue  10. Decreased Flexibility/Joint Mobility  11. Decreased Knowledge of Precautions  12. Decreased [place] with Home Exercise Program  13.  impaired body mechanics INTERVENTIONS PLANNED:  1. Balance Exercise  2. Bed Mobility  3. Cold  4. Cryotherapy  5. Electrical Stimulation  6. Family Education  7. Gait Training  8. Heat  9. Home Exercise Program (HEP)  10. Manual Therapy  11. Neuromuscular Re-education/Strengthening  12. Range of Motion (ROM)  13. Therapeutic Activites  14. Therapeutic Exercise/Strengthening  15. Transfer Training   TREATMENT PLAN:  Effective Dates: 1/13/2017 TO 4/13/2017. Frequency/Duration: 2 times a week for 8 weeks  GOALS: (Goals have been discussed and agreed upon with patient.)  Short Term Goals 4 weeks   1. Gracia Filler will be independent with HEP  2. Gracia Filler will participate in LE stretching program to increase flexibility  3. Gracia Filler will participate in core stabilization exercises to help with stabilization during ADLs  4. Gracia Filler will participate in LE strengthening program with weights as appropriate to help with gait and elevations  5. Gracia Filler will participate in static and dynamic balance activities to decrease the risk for falls  6. Gracia Filler will tolerate manual therapy/joint mobilizations/soft tissue to increase ROM and decrease pain  7. Patient will demonstrate safe body mechanics with lifting, bending, and transfers for improved lumbar spine pain and activity tolerance. Long Term Goals 8 weeks   1. Gracia Filler will demonstrate a 20 point improvement on the Oswestry to show improvement in function  2. Gracia Filler will report 0/10 pain at rest and during ADLs  3. Gracia Filler will demonstrate 5/5 LE strength on manual muscle testing  4. Gracia Filler will be able to perform SLS >5 seconds bilaterally to help with gait and improve balance  Rehabilitation Potential For Stated Goals: Good  Regarding Antonette Liz's therapy, I certify that the treatment plan above will be carried out by a therapist or under their direction.   Thank you for this referral,  Tanja Ko, PT     Referring Physician Signature: Davis Hoff*              Date                    HISTORY:    History of Present Injury/Illness (Reason for Referral):  Ms. Brandon Conrad reports getting her foot caught between boxes in a freezer at work and falling backwards into a metal rack and boxes on 11/10/2016. She reports immediate pain at that time that has continued and is increased with walking, getting up from the bed, up from a chair, and with sitting greater than 15 minutes. She reports that her pain is right sided and radiates into the right foot. She also reports that she has difficulty with picking any bending, twisting, and lifting. MRI imaging demonstrated no discal injury and no significant structural damage to the lumbar tissue. Patient goals are to return to work and to be safe with back movement for prevention of re-injury or further injury. Patient reports that symptoms have not changed since injury. Past Medical History/Comorbidities:   Ms. Brandon Conrad  has a past medical history of Hypothyroid. Ms. Brandon Conrad  has a past surgical history that includes hysterectomy and cholecystectomy. Social History/Living Environment:     lives in private residence with family  Prior Level of Function/Work/Activity:  Patient worked and was independent with all home and work duties prior to injury  Note: Patient denies any increase of symptoms with cough, sneeze or valsalva. Patient denies any saddle paresthesia or bowel/bladder deficits. Personal Factors:          Coping Style:  Seems to catastrophize injury        Social Background:  Some indications of family stress        Profession:          Past/Current Experience:  No previous history of back pain        Other factors that influence how disability is experienced by the patient:  Previous high level family stress due to loss of spouse in traumatic event.   Current Medications:    Current Outpatient Prescriptions:     diclofenac EC (VOLTAREN) 75 mg EC tablet, Take 1 Tab by mouth two (2) times a day., Disp: 10 Tab, Rfl: 0    levothyroxine (SYNTHROID) 75 mcg tablet, Take  by mouth Daily (before breakfast). , Disp: , Rfl:    Flexeril  norco  Ibeuprophen    Date Last Reviewed:  4/24/2017    Number of Personal Factors/Comorbidities that affect the Plan of Care:    EXAMINATION:    Observation/Orthostatic Postural Assessment:          Antalgic gait pattern with decreased right LE loading during gait. Right trunk lean in standing. Palpation:          Elevated right psis in standing and sitting. Tenderness with palpation of right quadratus lumborum and right piriformis  ROM:            AROM/PROM         Joint: Eval Date: 11/13/2106 Re-Assess Date:  Re-Assess Date:     RIGHT LEFT RIGHT LEFT RIGHT LEFT   Knee Extension         Knee Flexion         Hip Flexion         Hip Abduction         Lumbar Flexion 50% with Pain        Lumbar Extension 50% with pain        Lumbar Side-bending 50% with pain 50%with pain       Lumbar Rotation           Strength:    Joint: Eval Date:  Re-Assess Date:  Re-Assess Date:     RIGHT LEFT RIGHT LEFT RIGHT LEFT   Knee Flexion 5/5 5/5       Knee Extension 5/5 5/5       Hip Flexion 4/5 5/5       Hip Abduction 4/5 5/5       Ankle Dorsiflexion 5/5 5/5                           Single leg stance right/left: needed support on right              Deep squat: unable due to Right LE pain and c/o weakness    Ham 90/90:   RIGHT LE: -40   LEFT LE: -20    Special Tests: Assessed @ Initial Visit    ISRRAEL 4: + on right   SLUMP TEST: +on right  Neurological Screen: Assessed @ Initial Visit    RADIATING SYMPTOMS?: YES  Functional Mobility:  Assessed @ Initial Visit Affecting participation in basic ADLs and functional tasks.   Poor spinal mechanics with supine-sit, sit-stand, and picking up objects that demonstrated increased spinal flexion and discal loading    Balance:          Poor stability on right LE    Body Structures Involved:  1. Nerves  2. Joints  3. Muscles  4. Ligaments 5. Body Functions Affected:  1. Mental  2. Sensory/Pain  3. Neuromusculoskeletal  4. Movement Related  5. postural awareness Activities and Participation Affected:  1. General Tasks and Demands  2. Mobility  3. Self Care  4. Domestic Life  5. Interpersonal Interactions and Relationships  6. Community, Social and [place]  7. work duties   Number of elements that affect the Plan of Care:    CLINICAL PRESENTATION:    Presentation:  Evolving clinical presentation with changing clinical characteristics: MODERATE COMPLEXITY   CLINICAL DECISION MAKING:    Outcome Measure: Tool Used: Modified Oswestry Low Back Pain Questionnaire  Score:  Initial: 35/50  Most Recent: 32/50 (Date: -- )   Interpretation of Score: Each section is scored on a 0-5 scale, 5 representing the greatest disability. The scores of each section are added together for a total score of 50. Score 0 1-10 11-20 21-30 31-40 41-49 50   Modifier CH CI CJ CK CL CM CN       Medical Necessity:   · Skilled intervention continues to be required due to above deficits affecting participation in basic ADLs and overall functional tolerance. Reason for Services/Other Comments:  · Patient continues to require skilled intervention due to  above deficits affecting participation in basic ADLs and overall functional tolerance. ·    Use of outcome tool(s) and clinical judgement create a POC that gives a:    TREATMENT:    (In addition to Assessment/Re-Assessment sessions the following treatments were rendered)  THERAPEUTIC EXERCISE: (30 minutes):  Exercises per grid below to improve mobility, strength and balance. Required minimal visual and verbal cues to promote proper body alignment and promote proper body posture. Progressed resistance, range and complexity of movement as indicated.      Date:  4/18/2017   Activity/Exercise            Patient education on body mechanics Review for work duties x5' physiostep Level 2   x 10 minutes with moist heat to  back   Lower trunk rotation    Supine piriformis stretch 2x1'B   Foam rolling x20ea  -thoracic spine  -itb bilaterally  -gluts B  -adductors B   SB supine walkouts    Balance board    Tandem balance on a/x    Supine ITB stretch    Walking lunge stretch    SLS on A/x 3x30\"   Side steps/monster walks 3x20 feet each   Bridges with add/abd 2x20   Prone quadraped 2x20   TVA in supine with LE marches 2x20 B             MANUAL THERAPY: (10 minutes): Joint mobilization to right sacroiliac junction, Soft tissue mobilization and Manipulation was utilized and necessary because of the patient's restricted joint motion, painful spasm, restricted motion of soft tissue. Soft tissue mobilization to right thoracolumbar paraspinals, quadratus area, latissimus and right piriformis. R    (Used abbreviations: MET - muscle energy technique; PNF - proprioceptive neuromuscular facilitation; NMR - neuromuscular re-education; AP - anterior to posterior; PA - posterior to anterior)    MODALITIES: (0 minutes):      *  Cold Pack Therapy in order to provide analgesia, relieve muscle spasm and reduce inflammation and edema. With patient supine with knee wedge in place and ice pack on right lumbar area and wrapped around right pelvic area to cover tender and painful areas. No abnormal skin reaction present. Pre-treatment report: Patient reports increased symptoms after returning to full work duties. Treatment/Session Assessment:  Patient tolerated treatment well with improved lumbar paraspinal tone and soft tissue mobility after treatment. Improved pain with gait after treatment. Cueing for improved core positioning during bridging and balance activities. · Pain/ Symptoms: Initial:  4/10 Post Session: 2/10 ·   Compliance with Program/Exercises: Will assess as treatment progresses. · Recommendations/Intent for next treatment session:  \"Next visit will focus on advancements to more challenging activities\".   Total Treatment Duration:  PT Patient Time In/Time Out  Time In: 1610  Time Out: [id number] [facility] [address], PT

## 2017-05-10 ENCOUNTER — HOSPITAL ENCOUNTER (OUTPATIENT)
Dept: PHYSICAL THERAPY | Age: 42
Discharge: HOME | End: 2017-05-10
Payer: COMMERCIAL

## 2017-05-10 NOTE — PROGRESS NOTES
Cookie Nuñez  : 1975 [facility] at [address], [address]  [phone]   [phone]        OUTPATIENT DAILY NOTE    NAME/AGE/GENDER: Cookie Nuñez is a 42 y.o. female. DATE: 5/10/2017    Patient cancelled for appointment today due to illness. Will plan to follow up on next scheduled visit.     Ovidio Lewis, PT

## 2017-05-12 ENCOUNTER — HOSPITAL ENCOUNTER (OUTPATIENT)
Dept: PHYSICAL THERAPY | Age: 42
Discharge: HOME | End: 2017-05-12
Payer: COMMERCIAL

## 2017-05-12 PROCEDURE — 97110 THERAPEUTIC EXERCISES: CPT

## 2017-05-12 NOTE — PROGRESS NOTES
Gayle De Jesus  : 1975 [facility] at [address]  [phone]   [phone]         OUTPATIENT PHYSICAL THERAPY:Daily Note 2017      ICD-10: Treatment Diagnosis:   Lumbago with sciatica, right side (M54.41)       Precautions/Allergies:   Tramadol   Fall Risk Score: 7 (? 5 = High Risk)  MD Orders: Eval and Treat  MEDICAL/REFERRING DIAGNOSIS:  Lumbar radiculopathy [M54.16]   DATE OF ONSET: fall on 11/10/2016  REFERRING PHYSICIAN: Melissa [address] [place] [address] Avenue: 2017     INITIAL ASSESSMENT:  Ms. Gayle De Jesus has attended 1 physical therapy session including initial evaluation. Gayle De Jesus exhibits decreased flexibility, increased pain, decreased postural and core strength, decreased functional tolerance. Patient demonstrates pelvic malalignment upon initial evaluation with decreased posture and impaired transfer tolerance. Gayle De Jesus will benefit from skilled PT (medically necessary) to address above deficits affecting participation in basic ADLs and overall functional tolerance. Manual techniques (stretching, joint mobilizations, soft tissue mobilization/myofascial release), postural exercises/education, therapeutic techniques/activities, and HEP will be performed as appropriate addressing Antonette Liz's current condition. PROBLEM LIST (Impacting functional limitations):  1. Decreased Strength  2. Decreased ADL/Functional Activities  3. Decreased Transfer Abilities  4. Decreased Ambulation Ability/Technique  5. Decreased Balance  6. Increased Pain  7. Decreased Activity Tolerance  8. Decreased Work Simplification/Energy Conservation Techniques  9. Increased Fatigue  10. Decreased Flexibility/Joint Mobility  11. Decreased Knowledge of Precautions  12. Decreased [place] with Home Exercise Program  13.  impaired body mechanics INTERVENTIONS PLANNED:  1. Balance Exercise  2. Bed Mobility  3. Cold  4. Cryotherapy  5. Electrical Stimulation  6. Family Education  7. Gait Training  8. Heat  9. Home Exercise Program (HEP)  10. Manual Therapy  11. Neuromuscular Re-education/Strengthening  12. Range of Motion (ROM)  13. Therapeutic Activites  14. Therapeutic Exercise/Strengthening  15. Transfer Training   TREATMENT PLAN:  Effective Dates: 1/13/2017 TO 4/13/2017. Frequency/Duration: 2 times a week for 8 weeks  GOALS: (Goals have been discussed and agreed upon with patient.)  Short Term Goals 4 weeks   1. Damon Vilchis will be independent with HEP  2. Damon Vilchis will participate in LE stretching program to increase flexibility  3. Damon Vilchis will participate in core stabilization exercises to help with stabilization during ADLs  4. Damon Vilchis will participate in LE strengthening program with weights as appropriate to help with gait and elevations  5. Damon Vilchis will participate in static and dynamic balance activities to decrease the risk for falls  6. Damon Vilchis will tolerate manual therapy/joint mobilizations/soft tissue to increase ROM and decrease pain  7. Patient will demonstrate safe body mechanics with lifting, bending, and transfers for improved lumbar spine pain and activity tolerance. Long Term Goals 8 weeks   1. Damon Vilchis will demonstrate a 20 point improvement on the Oswestry to show improvement in function  2. Damon Vilchis will report 0/10 pain at rest and during ADLs  3. Damon Vilchis will demonstrate 5/5 LE strength on manual muscle testing  4. Damon Vilchis will be able to perform SLS >5 seconds bilaterally to help with gait and improve balance  Rehabilitation Potential For Stated Goals: Good  Regarding Antonette Liz's therapy, I certify that the treatment plan above will be carried out by a therapist or under their direction.   Thank you for this referral,  Mendy Costa, PT     Referring Physician Signature: Teresa Jc*              Date                    HISTORY:    History of Present Injury/Illness (Reason for Referral):  Ms. Nicholas Finney reports getting her foot caught between boxes in a freezer at work and falling backwards into a metal rack and boxes on 11/10/2016. She reports immediate pain at that time that has continued and is increased with walking, getting up from the bed, up from a chair, and with sitting greater than 15 minutes. She reports that her pain is right sided and radiates into the right foot. She also reports that she has difficulty with picking any bending, twisting, and lifting. MRI imaging demonstrated no discal injury and no significant structural damage to the lumbar tissue. Patient goals are to return to work and to be safe with back movement for prevention of re-injury or further injury. Patient reports that symptoms have not changed since injury. Past Medical History/Comorbidities:   Ms. Nicholas Finney  has a past medical history of Hypothyroid. Ms. Nicholas Finney  has a past surgical history that includes hysterectomy and cholecystectomy. Social History/Living Environment:     lives in private residence with family  Prior Level of Function/Work/Activity:  Patient worked and was independent with all home and work duties prior to injury  Note: Patient denies any increase of symptoms with cough, sneeze or valsalva. Patient denies any saddle paresthesia or bowel/bladder deficits. Personal Factors:          Coping Style:  Seems to catastrophize injury        Social Background:  Some indications of family stress        Profession:          Past/Current Experience:  No previous history of back pain        Other factors that influence how disability is experienced by the patient:  Previous high level family stress due to loss of spouse in traumatic event.   Current Medications:    Current Outpatient Prescriptions:     diclofenac EC (VOLTAREN) 75 mg EC tablet, Take 1 Tab by mouth two (2) times a day., Disp: 10 Tab, Rfl: 0    levothyroxine (SYNTHROID) 75 mcg tablet, Take  by mouth Daily (before breakfast). , Disp: , Rfl:    Flexeril  norco  Ibeuprophen    Date Last Reviewed:  5/12/2017    Number of Personal Factors/Comorbidities that affect the Plan of Care:    EXAMINATION:    Observation/Orthostatic Postural Assessment:          Antalgic gait pattern with decreased right LE loading during gait. Right trunk lean in standing. Palpation:          Elevated right psis in standing and sitting. Tenderness with palpation of right quadratus lumborum and right piriformis  ROM:            AROM/PROM         Joint: Eval Date: 11/13/2106 Re-Assess Date:  Re-Assess Date:     RIGHT LEFT RIGHT LEFT RIGHT LEFT   Knee Extension         Knee Flexion         Hip Flexion         Hip Abduction         Lumbar Flexion 50% with Pain        Lumbar Extension 50% with pain        Lumbar Side-bending 50% with pain 50%with pain       Lumbar Rotation           Strength:    Joint: Eval Date:  Re-Assess Date:  Re-Assess Date:     RIGHT LEFT RIGHT LEFT RIGHT LEFT   Knee Flexion 5/5 5/5       Knee Extension 5/5 5/5       Hip Flexion 4/5 5/5       Hip Abduction 4/5 5/5       Ankle Dorsiflexion 5/5 5/5                           Single leg stance right/left: needed support on right              Deep squat: unable due to Right LE pain and c/o weakness    Ham 90/90:   RIGHT LE: -40   LEFT LE: -20    Special Tests: Assessed @ Initial Visit    ISRRAEL 4: + on right   SLUMP TEST: +on right  Neurological Screen: Assessed @ Initial Visit    RADIATING SYMPTOMS?: YES  Functional Mobility:  Assessed @ Initial Visit Affecting participation in basic ADLs and functional tasks.   Poor spinal mechanics with supine-sit, sit-stand, and picking up objects that demonstrated increased spinal flexion and discal loading    Balance:          Poor stability on right LE    Body Structures Involved:  1. Nerves  2. Joints  3. Muscles  4. Ligaments 5. Body Functions Affected:  1. Mental  2. Sensory/Pain  3. Neuromusculoskeletal  4. Movement Related  5. postural awareness Activities and Participation Affected:  1. General Tasks and Demands  2. Mobility  3. Self Care  4. Domestic Life  5. Interpersonal Interactions and Relationships  6. Community, Social and [place]  7. work duties   Number of elements that affect the Plan of Care:    CLINICAL PRESENTATION:    Presentation:  Evolving clinical presentation with changing clinical characteristics: MODERATE COMPLEXITY   CLINICAL DECISION MAKING:    Outcome Measure: Tool Used: Modified Oswestry Low Back Pain Questionnaire  Score:  Initial: 35/50  Most Recent: 32/50 (Date: -- )   Interpretation of Score: Each section is scored on a 0-5 scale, 5 representing the greatest disability. The scores of each section are added together for a total score of 50. Score 0 1-10 11-20 21-30 31-40 41-49 50   Modifier CH CI CJ CK CL CM CN       Medical Necessity:   · Skilled intervention continues to be required due to above deficits affecting participation in basic ADLs and overall functional tolerance. Reason for Services/Other Comments:  · Patient continues to require skilled intervention due to  above deficits affecting participation in basic ADLs and overall functional tolerance. ·    Use of outcome tool(s) and clinical judgement create a POC that gives a:    TREATMENT:    (In addition to Assessment/Re-Assessment sessions the following treatments were rendered)  THERAPEUTIC EXERCISE: (40 minutes):  Exercises per grid below to improve mobility, strength and balance. Required minimal visual and verbal cues to promote proper body alignment and promote proper body posture. Progressed resistance, range and complexity of movement as indicated.      Date:  5/12/2017   Activity/Exercise            Patient education on body mechanics    physiostep Level 2   x 10 minutes with moist heat to  back   Lower trunk rotation    Supine piriformis stretch 2x1'B   Foam rolling    SB supine walkouts    Balance board x2'ea direction   Tandem balance on a/x 2x1'   Supine ITB stretch    Walking lunge stretch    SLS on A/x 3x30\"   Side steps/monster walks 3x20 feet each   Bridges with add/abd 2x20   Prone quadraped 2x20   TVA in supine with LE marches 2x20 B       Prone lying x5'   Diaphragmatic breathing in supine hooklying  x5'                                 MANUAL THERAPY: (0 minutes): Joint mobilization to right sacroiliac junction, Soft tissue mobilization and Manipulation was utilized and necessary because of the patient's restricted joint motion, painful spasm, restricted motion of soft tissue. Soft tissue mobilization to right thoracolumbar paraspinals, quadratus area, latissimus and right piriformis. R    (Used abbreviations: MET - muscle energy technique; PNF - proprioceptive neuromuscular facilitation; NMR - neuromuscular re-education; AP - anterior to posterior; PA - posterior to anterior)    MODALITIES: (0 minutes):      *  Cold Pack Therapy in order to provide analgesia, relieve muscle spasm and reduce inflammation and edema. With patient supine with knee wedge in place and ice pack on right lumbar area and wrapped around right pelvic area to cover tender and painful areas. No abnormal skin reaction present. Pre-treatment report: Patient reports increased symptoms after returning to full work duties. Reports continued buttock pain. Treatment/Session Assessment:  Patient reported improved pain after treatment. · Pain/ Symptoms: Initial:  5/10 Post Session: 2/10 ·   Compliance with Program/Exercises: Will assess as treatment progresses. · Recommendations/Intent for next treatment session: \"Next visit will focus on advancements to more challenging activities\".   Total Treatment Duration:  PT Patient Time In/Time Out  Time In: 1530  Time Out: [facility], PT

## 2017-05-24 ENCOUNTER — HOSPITAL ENCOUNTER (OUTPATIENT)
Dept: PHYSICAL THERAPY | Age: 42
Discharge: HOME | End: 2017-05-24
Payer: COMMERCIAL

## 2017-05-24 PROCEDURE — 97110 THERAPEUTIC EXERCISES: CPT

## 2017-05-24 PROCEDURE — 97140 MANUAL THERAPY 1/> REGIONS: CPT

## 2017-05-24 NOTE — PROGRESS NOTES
Rey Olivares  : 1975 [facility] [address]  [phone]   [phone]         OUTPATIENT PHYSICAL THERAPY:Daily Note 2017      ICD-10: Treatment Diagnosis:   Lumbago with sciatica, right side (M54.41)       Precautions/Allergies:   Tramadol   Fall Risk Score: 7 (? 5 = High Risk)  MD Orders: Eval and Treat  MEDICAL/REFERRING DIAGNOSIS:  Lumbar radiculopathy [M54.16]   DATE OF ONSET: fall on 11/10/2016  REFERRING PHYSICIAN: Melissa [address] [place] [address] Avenue: 2017     INITIAL ASSESSMENT:  Ms. Rey Olivares has attended 1 physical therapy session including initial evaluation. Rey Olivares exhibits decreased flexibility, increased pain, decreased postural and core strength, decreased functional tolerance. Patient demonstrates pelvic malalignment upon initial evaluation with decreased posture and impaired transfer tolerance. Rey Olivares will benefit from skilled PT (medically necessary) to address above deficits affecting participation in basic ADLs and overall functional tolerance. Manual techniques (stretching, joint mobilizations, soft tissue mobilization/myofascial release), postural exercises/education, therapeutic techniques/activities, and HEP will be performed as appropriate addressing Antonette Liz's current condition. PROBLEM LIST (Impacting functional limitations):  1. Decreased Strength  2. Decreased ADL/Functional Activities  3. Decreased Transfer Abilities  4. Decreased Ambulation Ability/Technique  5. Decreased Balance  6. Increased Pain  7. Decreased Activity Tolerance  8. Decreased Work Simplification/Energy Conservation Techniques  9. Increased Fatigue  10. Decreased Flexibility/Joint Mobility  11. Decreased Knowledge of Precautions  12. Decreased [place] with Home Exercise Program  13.  impaired body mechanics INTERVENTIONS PLANNED:  1. Balance Exercise  2. Bed Mobility  3. Cold  4. Cryotherapy  5. Electrical Stimulation  6. Family Education  7. Gait Training  8. Heat  9. Home Exercise Program (HEP)  10. Manual Therapy  11. Neuromuscular Re-education/Strengthening  12. Range of Motion (ROM)  13. Therapeutic Activites  14. Therapeutic Exercise/Strengthening  15. Transfer Training   TREATMENT PLAN:  Effective Dates: 1/13/2017 TO 4/13/2017. Frequency/Duration: 2 times a week for 8 weeks  GOALS: (Goals have been discussed and agreed upon with patient.)  Short Term Goals 4 weeks   1. Gayle De Jesus will be independent with HEP  2. Gayle De Jesus will participate in LE stretching program to increase flexibility  3. Gayle De Jesus will participate in core stabilization exercises to help with stabilization during ADLs  4. Gayle De Jesus will participate in LE strengthening program with weights as appropriate to help with gait and elevations  5. Gayle De Jesus will participate in static and dynamic balance activities to decrease the risk for falls  6. Gayle De Jesus will tolerate manual therapy/joint mobilizations/soft tissue to increase ROM and decrease pain  7. Patient will demonstrate safe body mechanics with lifting, bending, and transfers for improved lumbar spine pain and activity tolerance. Long Term Goals 8 weeks   1. Gayle De Jesus will demonstrate a 20 point improvement on the Oswestry to show improvement in function  2. Galye De Jesus will report 0/10 pain at rest and during ADLs  3. Gayle De Jesus will demonstrate 5/5 LE strength on manual muscle testing  4. Gayle De Jesus will be able to perform SLS >5 seconds bilaterally to help with gait and improve balance  Rehabilitation Potential For Stated Goals: Good  Regarding Antonette Liz's therapy, I certify that the treatment plan above will be carried out by a therapist or under their direction.   Thank you for this referral,  Sindy Scott, PT     Referring Physician Signature: Antionette Marinelli*              Date                    HISTORY:    History of Present Injury/Illness (Reason for Referral):  Ms. Pratibha Avery reports getting her foot caught between boxes in a freezer at work and falling backwards into a metal rack and boxes on 11/10/2016. She reports immediate pain at that time that has continued and is increased with walking, getting up from the bed, up from a chair, and with sitting greater than 15 minutes. She reports that her pain is right sided and radiates into the right foot. She also reports that she has difficulty with picking any bending, twisting, and lifting. MRI imaging demonstrated no discal injury and no significant structural damage to the lumbar tissue. Patient goals are to return to work and to be safe with back movement for prevention of re-injury or further injury. Patient reports that symptoms have not changed since injury. Past Medical History/Comorbidities:   Ms. Pratibha Avery  has a past medical history of Hypothyroid. Ms. Pratibha Avery  has a past surgical history that includes hysterectomy and cholecystectomy. Social History/Living Environment:     lives in private residence with family  Prior Level of Function/Work/Activity:  Patient worked and was independent with all home and work duties prior to injury  Note: Patient denies any increase of symptoms with cough, sneeze or valsalva. Patient denies any saddle paresthesia or bowel/bladder deficits. Personal Factors:          Coping Style:  Seems to catastrophize injury        Social Background:  Some indications of family stress        Profession:          Past/Current Experience:  No previous history of back pain        Other factors that influence how disability is experienced by the patient:  Previous high level family stress due to loss of spouse in traumatic event.   Current Medications:    Current Outpatient Prescriptions:     diclofenac EC (VOLTAREN) 75 mg EC tablet, Take 1 Tab by mouth two (2) times a day., Disp: 10 Tab, Rfl: 0    levothyroxine (SYNTHROID) 75 mcg tablet, Take  by mouth Daily (before breakfast). , Disp: , Rfl:    Flexeril  norco  Ibeuprophen    Date Last Reviewed:  5/24/2017    Number of Personal Factors/Comorbidities that affect the Plan of Care:    EXAMINATION:    Observation/Orthostatic Postural Assessment:          Antalgic gait pattern with decreased right LE loading during gait. Right trunk lean in standing. Palpation:          Elevated right psis in standing and sitting. Tenderness with palpation of right quadratus lumborum and right piriformis  ROM:            AROM/PROM         Joint: Eval Date: 11/13/2106 Re-Assess Date:  Re-Assess Date:     RIGHT LEFT RIGHT LEFT RIGHT LEFT   Knee Extension         Knee Flexion         Hip Flexion         Hip Abduction         Lumbar Flexion 50% with Pain        Lumbar Extension 50% with pain        Lumbar Side-bending 50% with pain 50%with pain       Lumbar Rotation           Strength:    Joint: Eval Date:  Re-Assess Date:  Re-Assess Date:     RIGHT LEFT RIGHT LEFT RIGHT LEFT   Knee Flexion 5/5 5/5       Knee Extension 5/5 5/5       Hip Flexion 4/5 5/5       Hip Abduction 4/5 5/5       Ankle Dorsiflexion 5/5 5/5                           Single leg stance right/left: needed support on right              Deep squat: unable due to Right LE pain and c/o weakness    Ham 90/90:   RIGHT LE: -40   LEFT LE: -20    Special Tests: Assessed @ Initial Visit    ISRRAEL 4: + on right   SLUMP TEST: +on right  Neurological Screen: Assessed @ Initial Visit    RADIATING SYMPTOMS?: YES  Functional Mobility:  Assessed @ Initial Visit Affecting participation in basic ADLs and functional tasks.   Poor spinal mechanics with supine-sit, sit-stand, and picking up objects that demonstrated increased spinal flexion and discal loading    Balance:          Poor stability on right LE    Body Structures Involved:  1. Nerves  2. Joints  3. Muscles  4. Ligaments 5. Body Functions Affected:  1. Mental  2. Sensory/Pain  3. Neuromusculoskeletal  4. Movement Related  5. postural awareness Activities and Participation Affected:  1. General Tasks and Demands  2. Mobility  3. Self Care  4. Domestic Life  5. Interpersonal Interactions and Relationships  6. Community, Social and [place]  7. work duties   Number of elements that affect the Plan of Care:    CLINICAL PRESENTATION:    Presentation:  Evolving clinical presentation with changing clinical characteristics: MODERATE COMPLEXITY   CLINICAL DECISION MAKING:    Outcome Measure: Tool Used: Modified Oswestry Low Back Pain Questionnaire  Score:  Initial: 35/50  Most Recent: 32/50 (Date: -- )   Interpretation of Score: Each section is scored on a 0-5 scale, 5 representing the greatest disability. The scores of each section are added together for a total score of 50. Score 0 1-10 11-20 21-30 31-40 41-49 50   Modifier CH CI CJ CK CL CM CN       Medical Necessity:   · Skilled intervention continues to be required due to above deficits affecting participation in basic ADLs and overall functional tolerance. Reason for Services/Other Comments:  · Patient continues to require skilled intervention due to  above deficits affecting participation in basic ADLs and overall functional tolerance. ·    Use of outcome tool(s) and clinical judgement create a POC that gives a:    TREATMENT:    (In addition to Assessment/Re-Assessment sessions the following treatments were rendered)  THERAPEUTIC EXERCISE: (30 minutes):  Exercises per grid below to improve mobility, strength and balance. Required minimal visual and verbal cues to promote proper body alignment and promote proper body posture. Progressed resistance, range and complexity of movement as indicated.      Date:  5/24/2017   Activity/Exercise            Patient education on body mechanics    physiostep Level 2   x 10 minutes with moist heat to  back   Lower trunk rotation    Supine piriformis stretch    Foam rolling    SB supine walkouts    Balance board    Tandem balance on a/x    Supine ITB stretch    Walking lunge stretch    SLS on A/x 3x30\"   Side steps/monster walks 3x20 feet each   Bridges with add/abd 2x20   Prone quadraped 2x20   TVA in supine with LE marches 2x20 B       Prone lying x5'   Diaphragmatic breathing in supine hooklying  x5'                                 MANUAL THERAPY: (15 minutes): Joint mobilization to right sacroiliac junction, Soft tissue mobilization and Manipulation was utilized and necessary because of the patient's restricted joint motion, painful spasm, restricted motion of soft tissue. Soft tissue mobilization to right thoracolumbar paraspinals, quadratus area, latissimus and right piriformis. (Used abbreviations: MET - muscle energy technique; PNF - proprioceptive neuromuscular facilitation; NMR - neuromuscular re-education; AP - anterior to posterior; PA - posterior to anterior)    MODALITIES: (0 minutes):      *  Cold Pack Therapy in order to provide analgesia, relieve muscle spasm and reduce inflammation and edema. With patient supine with knee wedge in place and ice pack on right lumbar area and wrapped around right pelvic area to cover tender and painful areas. No abnormal skin reaction present. Pre-treatment report: Patient reports increased symptoms after returning to full work duties. Reports continued buttock pain. Treatment/Session Assessment:  Patient reported improved pain after treatment. · Pain/ Symptoms: Initial:  5/10 Post Session: 2/10 ·   Compliance with Program/Exercises: Will assess as treatment progresses. · Recommendations/Intent for next treatment session: \"Next visit will focus on advancements to more challenging activities\".   Total Treatment Duration:  PT Patient Time In/Time Out  Time In: 1600  Time Out: [facility],

## 2017-06-05 ENCOUNTER — APPOINTMENT (OUTPATIENT)
Dept: PHYSICAL THERAPY | Age: 42
End: 2017-06-05
Payer: COMMERCIAL

## 2017-06-07 ENCOUNTER — APPOINTMENT (OUTPATIENT)
Dept: PHYSICAL THERAPY | Age: 42
End: 2017-06-07
Payer: COMMERCIAL

## 2017-06-07 ENCOUNTER — HOSPITAL ENCOUNTER (OUTPATIENT)
Dept: PHYSICAL THERAPY | Age: 42
Discharge: HOME | End: 2017-06-07
Payer: COMMERCIAL

## 2017-06-07 PROCEDURE — 97110 THERAPEUTIC EXERCISES: CPT

## 2017-06-07 PROCEDURE — 97140 MANUAL THERAPY 1/> REGIONS: CPT

## 2017-06-07 NOTE — PROGRESS NOTES
Claudene Ly  : 1975 [facility] at [address]  [phone]   [phone]         OUTPATIENT PHYSICAL THERAPY:Daily Note 2017      ICD-10: Treatment Diagnosis:   Lumbago with sciatica, right side (M54.41)       Precautions/Allergies:   Tramadol   Fall Risk Score: 7 (? 5 = High Risk)  MD Orders: Eval and Treat  MEDICAL/REFERRING DIAGNOSIS:  Lumbar radiculopathy [M54.16]   DATE OF ONSET: fall on 11/10/2016  REFERRING PHYSICIAN: Melissa, [address] [place] [address] Avenue: 2017     INITIAL ASSESSMENT:  Ms. Claudene Ly has attended 1 physical therapy session including initial evaluation. Claudene Ly exhibits decreased flexibility, increased pain, decreased postural and core strength, decreased functional tolerance. Patient demonstrates pelvic malalignment upon initial evaluation with decreased posture and impaired transfer tolerance. Claudene Ly will benefit from skilled PT (medically necessary) to address above deficits affecting participation in basic ADLs and overall functional tolerance. Manual techniques (stretching, joint mobilizations, soft tissue mobilization/myofascial release), postural exercises/education, therapeutic techniques/activities, and HEP will be performed as appropriate addressing Antonette Liz's current condition. PROBLEM LIST (Impacting functional limitations):  1. Decreased Strength  2. Decreased ADL/Functional Activities  3. Decreased Transfer Abilities  4. Decreased Ambulation Ability/Technique  5. Decreased Balance  6. Increased Pain  7. Decreased Activity Tolerance  8. Decreased Work Simplification/Energy Conservation Techniques  9. Increased Fatigue  10. Decreased Flexibility/Joint Mobility  11. Decreased Knowledge of Precautions  12. Decreased [place] with Home Exercise Program  13.  impaired body mechanics INTERVENTIONS PLANNED:  1. Balance Exercise  2. Bed Mobility  3. Cold  4. Cryotherapy  5. Electrical Stimulation  6. Family Education  7. Gait Training  8. Heat  9. Home Exercise Program (HEP)  10. Manual Therapy  11. Neuromuscular Re-education/Strengthening  12. Range of Motion (ROM)  13. Therapeutic Activites  14. Therapeutic Exercise/Strengthening  15. Transfer Training   TREATMENT PLAN:  Effective Dates: 1/13/2017 TO 4/13/2017. Frequency/Duration: 2 times a week for 8 weeks  GOALS: (Goals have been discussed and agreed upon with patient.)  Short Term Goals 4 weeks   1. Dipak Lopes will be independent with HEP  2. Dipak Lopes will participate in LE stretching program to increase flexibility  3. Dipak Lopes will participate in core stabilization exercises to help with stabilization during ADLs  4. Dipak Lopes will participate in LE strengthening program with weights as appropriate to help with gait and elevations  5. Dipak Lopes will participate in static and dynamic balance activities to decrease the risk for falls  6. Dipak Lopes will tolerate manual therapy/joint mobilizations/soft tissue to increase ROM and decrease pain  7. Patient will demonstrate safe body mechanics with lifting, bending, and transfers for improved lumbar spine pain and activity tolerance. Long Term Goals 8 weeks   1. Dipak Lopes will demonstrate a 20 point improvement on the Oswestry to show improvement in function  2. Dipak Lopes will report 0/10 pain at rest and during ADLs  3. Dipak Lopes will demonstrate 5/5 LE strength on manual muscle testing  4. Dipak Lopes will be able to perform SLS >5 seconds bilaterally to help with gait and improve balance  Rehabilitation Potential For Stated Goals: Good  Regarding Antonette Liz's therapy, I certify that the treatment plan above will be carried out by a therapist or under their direction.   Thank you for this referral,  Gadiel Mcmanus PTA     Referring Physician Signature: Enidgerardo CALVERT*              Date                    HISTORY:    History of Present Injury/Illness (Reason for Referral):  Ms. Tashi Santana reports getting her foot caught between boxes in a freezer at work and falling backwards into a metal rack and boxes on 11/10/2016. She reports immediate pain at that time that has continued and is increased with walking, getting up from the bed, up from a chair, and with sitting greater than 15 minutes. She reports that her pain is right sided and radiates into the right foot. She also reports that she has difficulty with picking any bending, twisting, and lifting. MRI imaging demonstrated no discal injury and no significant structural damage to the lumbar tissue. Patient goals are to return to work and to be safe with back movement for prevention of re-injury or further injury. Patient reports that symptoms have not changed since injury. Past Medical History/Comorbidities:   Ms. Tashi Santana  has a past medical history of Hypothyroid. Ms. Tashi Santana  has a past surgical history that includes hysterectomy and cholecystectomy. Social History/Living Environment:     lives in private residence with family  Prior Level of Function/Work/Activity:  Patient worked and was independent with all home and work duties prior to injury  Note: Patient denies any increase of symptoms with cough, sneeze or valsalva. Patient denies any saddle paresthesia or bowel/bladder deficits. Personal Factors:          Coping Style:  Seems to catastrophize injury        Social Background:  Some indications of family stress        Profession:          Past/Current Experience:  No previous history of back pain        Other factors that influence how disability is experienced by the patient:  Previous high level family stress due to loss of spouse in traumatic event.   Current Medications:    Current Outpatient Prescriptions:     diclofenac EC (VOLTAREN) 75 mg EC tablet, Take 1 Tab by mouth two (2) times a day., Disp: 10 Tab, Rfl: 0    levothyroxine (SYNTHROID) 75 mcg tablet, Take  by mouth Daily (before breakfast). , Disp: , Rfl:    Flexeril  norco  Ibeuprophen    Date Last Reviewed:  6/7/2017    Number of Personal Factors/Comorbidities that affect the Plan of Care:    EXAMINATION:    Observation/Orthostatic Postural Assessment:          Antalgic gait pattern with decreased right LE loading during gait. Right trunk lean in standing. Palpation:          Elevated right psis in standing and sitting. Tenderness with palpation of right quadratus lumborum and right piriformis  ROM:            AROM/PROM         Joint: Eval Date: 11/13/2106 Re-Assess Date:  Re-Assess Date:     RIGHT LEFT RIGHT LEFT RIGHT LEFT   Knee Extension         Knee Flexion         Hip Flexion         Hip Abduction         Lumbar Flexion 50% with Pain        Lumbar Extension 50% with pain        Lumbar Side-bending 50% with pain 50%with pain       Lumbar Rotation           Strength:    Joint: Eval Date:  Re-Assess Date:  Re-Assess Date:     RIGHT LEFT RIGHT LEFT RIGHT LEFT   Knee Flexion 5/5 5/5       Knee Extension 5/5 5/5       Hip Flexion 4/5 5/5       Hip Abduction 4/5 5/5       Ankle Dorsiflexion 5/5 5/5                           Single leg stance right/left: needed support on right              Deep squat: unable due to Right LE pain and c/o weakness    Ham 90/90:   RIGHT LE: -40   LEFT LE: -20    Special Tests: Assessed @ Initial Visit    ISRRAEL 4: + on right   SLUMP TEST: +on right  Neurological Screen: Assessed @ Initial Visit    RADIATING SYMPTOMS?: YES  Functional Mobility:  Assessed @ Initial Visit Affecting participation in basic ADLs and functional tasks. Poor spinal mechanics with supine-sit, sit-stand, and picking up objects that demonstrated increased spinal flexion and discal loading    Balance:          Poor stability on right LE    Body Structures Involved:  1. Nerves  2. Joints  3. Muscles  4. Ligaments 5. Body Functions Affected:  1. Mental  2. Sensory/Pain  3. Neuromusculoskeletal  4. Movement Related  5. postural awareness Activities and Participation Affected:  1. General Tasks and Demands  2. Mobility  3. Self Care  4. Domestic Life  5. Interpersonal Interactions and Relationships  6. Community, Social and [place]  7. work duties   Number of elements that affect the Plan of Care:    CLINICAL PRESENTATION:    Presentation:  Evolving clinical presentation with changing clinical characteristics: MODERATE COMPLEXITY   CLINICAL DECISION MAKING:    Outcome Measure: Tool Used: Modified Oswestry Low Back Pain Questionnaire  Score:  Initial: 35/50  Most Recent: 32/50 (Date: -- )   Interpretation of Score: Each section is scored on a 0-5 scale, 5 representing the greatest disability. The scores of each section are added together for a total score of 50. Score 0 1-10 11-20 21-30 31-40 41-49 50   Modifier CH CI CJ CK CL CM CN       Medical Necessity:   · Skilled intervention continues to be required due to above deficits affecting participation in basic ADLs and overall functional tolerance. Reason for Services/Other Comments:  · Patient continues to require skilled intervention due to  above deficits affecting participation in basic ADLs and overall functional tolerance. ·    Use of outcome tool(s) and clinical judgement create a POC that gives a:    TREATMENT:    (In addition to Assessment/Re-Assessment sessions the following treatments were rendered)  THERAPEUTIC EXERCISE: (25 minutes):  Exercises per grid below to improve mobility, strength and balance. Required minimal visual and verbal cues to promote proper body alignment and promote proper body posture. Progressed resistance, range and complexity of movement as indicated.      Date:  5/24/2017 Date  6/7/17   Activity/Exercise               Patient education on body mechanics     physiostep Level 2   x 10 minutes with moist heat to  back Level 3  X 10 minutes with moist heat to back   Lower trunk rotation  3 x 30 second hold   Supine piriformis stretch  3 x 30 second hold   Foam rolling     SB supine walkouts     Balance board     Tandem balance on a/x     Supine ITB stretch     Walking lunge stretch     SLS on A/x 3x30\"    Side steps/monster walks 3x20 feet each    Bridges with add/abd 2x20 2 x 20 reps    Prone quadraped 2x20    TVA in supine with LE marches 2x20 B         Prone lying x5' X 5 minutes   Diaphragmatic breathing in supine hooklying  x5'                                        MANUAL THERAPY: (15 minutes): Patient prone for STM with pillow under feet to bilateral low back, quadratus, piriformis, latissimus. Patient with palpable tenderness in right piriformis and upper glut and lumbar paraspinals with with tightness and immobility in lumbar area with myofascial release in this area to help decrease tightness. Soft tissue mobilization and Manipulation was utilized and necessary because of the patient's restricted joint motion, painful spasm, restricted motion of soft tissue. (Used abbreviations: MET - muscle energy technique; PNF - proprioceptive neuromuscular facilitation; NMR - neuromuscular re-education; AP - anterior to posterior; PA - posterior to anterior)  MODALITIES: ( 10 minutes) Moist heat to back while patient on NuStep prior to manual treatment for 10 minutes and following manual patient prone for ice pack x 10 minutes to low back and right piriformis and glut area to help decrease pain and help improve mobility. Pre-treatment report: Patient reports pain increased today with weather being wet. Pain level is 8/10 in right low back, buttocks and down leg to right heel. Patient reports working on exercises at home and they help some. Working with increased pain due to a lot of standing. Patient states she gets fatigued with pain when working.        Treatment/Session Assessment:  Patient reported  · Pain/ Symptoms: Initial: 8/10 pain in right low back and down right leg to heel Post Session: Patient reported good relief with manual treatment today but did not give a number after treatment. ·   Compliance with Program/Exercises: Will assess as treatment progresses. · Recommendations/Intent for next treatment session: \"Next visit will focus on advancements to more challenging activities\".   Total Treatment Duration:  PT Patient Time In/Time Out  Time In: 0915  Time Out: [address], PTA

## 2017-06-27 ENCOUNTER — HOSPITAL ENCOUNTER (OUTPATIENT)
Dept: PHYSICAL THERAPY | Age: 42
Discharge: HOME | End: 2017-06-27
Payer: COMMERCIAL

## 2017-06-27 PROCEDURE — 97110 THERAPEUTIC EXERCISES: CPT

## 2017-06-27 NOTE — PROGRESS NOTES
Janneth Plummer  : 1975 Therapy Center at [facility]  [address]  [phone]   [phone]         OUTPATIENT PHYSICAL THERAPY:Daily Note 2017      ICD-10: Treatment Diagnosis:   Lumbago with sciatica, right side (M54.41)       Precautions/Allergies:   Tramadol   Fall Risk Score: 7 (? 5 = High Risk)  MD Orders: Eval and Treat  MEDICAL/REFERRING DIAGNOSIS:  Lumbar radiculopathy [M54.16]   DATE OF ONSET: fall on 11/10/2016  REFERRING PHYSICIAN: Ganga Torres [address]: 2017     INITIAL ASSESSMENT:  Ms. Janneth Plummer has attended 1 physical therapy session including initial evaluation. Janneth Plummer exhibits decreased flexibility, increased pain, decreased postural and core strength, decreased functional tolerance. Patient demonstrates pelvic malalignment upon initial evaluation with decreased posture and impaired transfer tolerance. Janneth Plummer will benefit from skilled PT (medically necessary) to address above deficits affecting participation in basic ADLs and overall functional tolerance. Manual techniques (stretching, joint mobilizations, soft tissue mobilization/myofascial release), postural exercises/education, therapeutic techniques/activities, and HEP will be performed as appropriate addressing Antonette Liz's current condition. PROBLEM LIST (Impacting functional limitations):  1. Decreased Strength  2. Decreased ADL/Functional Activities  3. Decreased Transfer Abilities  4. Decreased Ambulation Ability/Technique  5. Decreased Balance  6. Increased Pain  7. Decreased Activity Tolerance  8. Decreased Work Simplification/Energy Conservation Techniques  9. Increased Fatigue  10. Decreased Flexibility/Joint Mobility  11. Decreased Knowledge of Precautions  12. Decreased [place] with Home Exercise Program  13.  impaired body mechanics INTERVENTIONS PLANNED:  1. Balance Exercise  2. Bed Mobility  3. Cold  4. Cryotherapy  5. Electrical Stimulation  6. Family Education  7. Gait Training  8. Heat  9. Home Exercise Program (HEP)  10. Manual Therapy  11. Neuromuscular Re-education/Strengthening  12. Range of Motion (ROM)  13. Therapeutic Activites  14. Therapeutic Exercise/Strengthening  15. Transfer Training   TREATMENT PLAN:  Effective Dates: 1/13/2017 TO 4/13/2017. Frequency/Duration: 2 times a week for 8 weeks  GOALS: (Goals have been discussed and agreed upon with patient.)  Short Term Goals 4 weeks   1. Patt White will be independent with HEP  2. Patt White will participate in LE stretching program to increase flexibility  3. Patt White will participate in core stabilization exercises to help with stabilization during ADLs  4. Patt White will participate in LE strengthening program with weights as appropriate to help with gait and elevations  5. Patt White will participate in static and dynamic balance activities to decrease the risk for falls  6. Patt White will tolerate manual therapy/joint mobilizations/soft tissue to increase ROM and decrease pain  7. Patient will demonstrate safe body mechanics with lifting, bending, and transfers for improved lumbar spine pain and activity tolerance. Long Term Goals 8 weeks   1. Patt White will demonstrate a 20 point improvement on the Oswestry to show improvement in function  2. Patt White will report 0/10 pain at rest and during ADLs  3. Patt White will demonstrate 5/5 LE strength on manual muscle testing  4. Patt White will be able to perform SLS >5 seconds bilaterally to help with gait and improve balance  Rehabilitation Potential For Stated Goals: Good  Regarding Antonette Liz's therapy, I certify that the treatment plan above will be carried out by a therapist or under their direction.   Thank you for this referral,  Maxwell Reeder, PT     Referring Physician Signature: Sunshine Baltabrian*              Date                    HISTORY:    History of Present Injury/Illness (Reason for Referral):  Ms. Lidia Champion reports getting her foot caught between boxes in a freezer at work and falling backwards into a metal rack and boxes on 11/10/2016. She reports immediate pain at that time that has continued and is increased with walking, getting up from the bed, up from a chair, and with sitting greater than 15 minutes. She reports that her pain is right sided and radiates into the right foot. She also reports that she has difficulty with picking any bending, twisting, and lifting. MRI imaging demonstrated no discal injury and no significant structural damage to the lumbar tissue. Patient goals are to return to work and to be safe with back movement for prevention of re-injury or further injury. Patient reports that symptoms have not changed since injury. Past Medical History/Comorbidities:   Ms. Lidia Champion  has a past medical history of Hypothyroid. Ms. Lidia Champion  has a past surgical history that includes hysterectomy and cholecystectomy. Social History/Living Environment:     lives in private residence with family  Prior Level of Function/Work/Activity:  Patient worked and was independent with all home and work duties prior to injury  Note: Patient denies any increase of symptoms with cough, sneeze or valsalva. Patient denies any saddle paresthesia or bowel/bladder deficits. Personal Factors:          Coping Style:  Seems to catastrophize injury        Social Background:  Some indications of family stress        Profession:          Past/Current Experience:  No previous history of back pain        Other factors that influence how disability is experienced by the patient:  Previous high level family stress due to loss of spouse in traumatic event.   Current Medications:    Current Outpatient Prescriptions:     diclofenac EC (VOLTAREN) 75 mg EC tablet, Take 1 Tab by mouth two (2) times a day., Disp: 10 Tab, Rfl: 0    levothyroxine (SYNTHROID) 75 mcg tablet, Take  by mouth Daily (before breakfast). , Disp: , Rfl:    Flexeril  norco  Ibeuprophen    Date Last Reviewed:  6/27/2017    Number of Personal Factors/Comorbidities that affect the Plan of Care:    EXAMINATION:    Observation/Orthostatic Postural Assessment:          Antalgic gait pattern with decreased right LE loading during gait. Right trunk lean in standing. Palpation:          Elevated right psis in standing and sitting. Tenderness with palpation of right quadratus lumborum and right piriformis  ROM:            AROM/PROM         Joint: Eval Date: 11/13/2106 Re-Assess Date:  Re-Assess Date:     RIGHT LEFT RIGHT LEFT RIGHT LEFT   Knee Extension         Knee Flexion         Hip Flexion         Hip Abduction         Lumbar Flexion 50% with Pain        Lumbar Extension 50% with pain        Lumbar Side-bending 50% with pain 50%with pain       Lumbar Rotation           Strength:    Joint: Eval Date:  Re-Assess Date:  Re-Assess Date:     RIGHT LEFT RIGHT LEFT RIGHT LEFT   Knee Flexion 5/5 5/5       Knee Extension 5/5 5/5       Hip Flexion 4/5 5/5       Hip Abduction 4/5 5/5       Ankle Dorsiflexion 5/5 5/5                           Single leg stance right/left: needed support on right              Deep squat: unable due to Right LE pain and c/o weakness    Ham 90/90:   RIGHT LE: -40   LEFT LE: -20    Special Tests: Assessed @ Initial Visit    ISRRAEL 4: + on right   SLUMP TEST: +on right  Neurological Screen: Assessed @ Initial Visit    RADIATING SYMPTOMS?: YES  Functional Mobility:  Assessed @ Initial Visit Affecting participation in basic ADLs and functional tasks.   Poor spinal mechanics with supine-sit, sit-stand, and picking up objects that demonstrated increased spinal flexion and discal loading    Balance:          Poor stability on right LE    Body Structures Involved:  1. Nerves  2. Joints  3. Muscles  4. Ligaments 5. Body Functions Affected:  1. Mental  2. Sensory/Pain  3. Neuromusculoskeletal  4. Movement Related  5. postural awareness Activities and Participation Affected:  1. General Tasks and Demands  2. Mobility  3. Self Care  4. Domestic Life  5. Interpersonal Interactions and Relationships  6. Community, Social and [place]  7. work duties   Number of elements that affect the Plan of Care:    CLINICAL PRESENTATION:    Presentation:  Evolving clinical presentation with changing clinical characteristics: MODERATE COMPLEXITY   CLINICAL DECISION MAKING:    Outcome Measure: Tool Used: Modified Oswestry Low Back Pain Questionnaire  Score:  Initial: 35/50  Most Recent: 32/50 (Date: -- )   Interpretation of Score: Each section is scored on a 0-5 scale, 5 representing the greatest disability. The scores of each section are added together for a total score of 50. Score 0 1-10 11-20 21-30 31-40 41-49 50   Modifier CH CI CJ CK CL CM CN       Medical Necessity:   · Skilled intervention continues to be required due to above deficits affecting participation in basic ADLs and overall functional tolerance. Reason for Services/Other Comments:  · Patient continues to require skilled intervention due to  above deficits affecting participation in basic ADLs and overall functional tolerance. ·    Use of outcome tool(s) and clinical judgement create a POC that gives a:    TREATMENT:    (In addition to Assessment/Re-Assessment sessions the following treatments were rendered)  THERAPEUTIC EXERCISE: (25 minutes):  Exercises per grid below to improve mobility, strength and balance. Required minimal visual and verbal cues to promote proper body alignment and promote proper body posture. Progressed resistance, range and complexity of movement as indicated.      Date:  5/24/2017 Date  6/7/17 Date;  6/27/2017   Activity/Exercise                  Patient education on body mechanics physiostep Level 2   x 10 minutes with moist heat to  back Level 3  X 10 minutes with moist heat to back Level 3  X 5 minutes with moist heat to back   Lower trunk rotation  3 x 30 second hold    Supine piriformis stretch  3 x 30 second hold    Foam rolling      SB supine walkouts      Balance board      Tandem balance on a/x      Supine ITB stretch      Walking lunge stretch      SLS on A/x 3x30\"  3x30\"   Side steps/monster walks 3x20 feet each  3x20   Bridges with add/abd 2x20 2 x 20 reps  220   Prone quadraped 2x20  2x20   TVA in supine with LE marches 2x20 B  2x20         Prone lying x5' X 5 minutes    Diaphragmatic breathing in supine hooklying  x5'     Lunge steps   3x10 B                                       MANUAL THERAPY: (0 minutes): Patient prone for STM with pillow under feet to bilateral low back, quadratus, piriformis, latissimus. Patient with palpable tenderness in right piriformis and upper glut and lumbar paraspinals with with tightness and immobility in lumbar area with myofascial release in this area to help decrease tightness. Soft tissue mobilization and Manipulation was utilized and necessary because of the patient's restricted joint motion, painful spasm, restricted motion of soft tissue. (Used abbreviations: MET - muscle energy technique; PNF - proprioceptive neuromuscular facilitation; NMR - neuromuscular re-education; AP - anterior to posterior; PA - posterior to anterior)  MODALITIES: ( 10 minutes) Moist heat to back while patient on NuStep prior to manual treatment for 10 minutes and following manual patient prone for ice pack x 10 minutes to low back and right piriformis and glut area to help decrease pain and help improve mobility. Pre-treatment report: Patient reports pain continues to increase with work duties and standing. Reports that she currently just presses through it.        Treatment/Session Assessment:  Patient reported mild increased soreness today  · Pain/ Symptoms: Initial:  6/10 pain in right low back and down right leg to knee Post Session: Patient reported improved symptoms with TVA cueing. ·   Compliance with Program/Exercises: Will assess as treatment progresses. · Recommendations/Intent for next treatment session: \"Next visit will focus on advancements to more challenging activities\".   Total Treatment Duration:  PT Patient Time In/Time Out  Time In: 1445  Time Out: 16 [facility], PT

## 2017-06-28 ENCOUNTER — HOSPITAL ENCOUNTER (OUTPATIENT)
Dept: PHYSICAL THERAPY | Age: 42
Discharge: HOME | End: 2017-06-28
Payer: COMMERCIAL

## 2017-06-28 PROCEDURE — 97110 THERAPEUTIC EXERCISES: CPT

## 2017-06-30 ENCOUNTER — HOSPITAL ENCOUNTER (OUTPATIENT)
Dept: PHYSICAL THERAPY | Age: 42
Discharge: HOME | End: 2017-06-30
Payer: COMMERCIAL

## 2017-07-05 NOTE — PROGRESS NOTES
Carlos Lyons  : 1975 Therapy Center at [facility]  [address]  [phone]   [phone]         OUTPATIENT PHYSICAL THERAPY:Daily Note 2017      ICD-10: Treatment Diagnosis:   Lumbago with sciatica, right side (M54.41)       Precautions/Allergies:   Tramadol   Fall Risk Score: 7 (? 5 = High Risk)  MD Orders: Eval and Treat  MEDICAL/REFERRING DIAGNOSIS:  Lumbar radiculopathy [M54.16]   DATE OF ONSET: fall on 11/10/2016  REFERRING PHYSICIAN: Melissa [address] [place] [address] Avenue: 2017     INITIAL ASSESSMENT:  Ms. Carlos Lyons has attended 1 physical therapy session including initial evaluation. Carlos Lyons exhibits decreased flexibility, increased pain, decreased postural and core strength, decreased functional tolerance. Patient demonstrates pelvic malalignment upon initial evaluation with decreased posture and impaired transfer tolerance. Carlos Lyons will benefit from skilled PT (medically necessary) to address above deficits affecting participation in basic ADLs and overall functional tolerance. Manual techniques (stretching, joint mobilizations, soft tissue mobilization/myofascial release), postural exercises/education, therapeutic techniques/activities, and HEP will be performed as appropriate addressing Antonette Liz's current condition. PROBLEM LIST (Impacting functional limitations):  1. Decreased Strength  2. Decreased ADL/Functional Activities  3. Decreased Transfer Abilities  4. Decreased Ambulation Ability/Technique  5. Decreased Balance  6. Increased Pain  7. Decreased Activity Tolerance  8. Decreased Work Simplification/Energy Conservation Techniques  9. Increased Fatigue  10. Decreased Flexibility/Joint Mobility  11. Decreased Knowledge of Precautions  12. Decreased [place] with Home Exercise Program  13.  impaired body mechanics INTERVENTIONS PLANNED:  1. Balance Exercise  2. Bed Mobility  3. Cold  4. Cryotherapy  5. Electrical Stimulation  6. Family Education  7. Gait Training  8. Heat  9. Home Exercise Program (HEP)  10. Manual Therapy  11. Neuromuscular Re-education/Strengthening  12. Range of Motion (ROM)  13. Therapeutic Activites  14. Therapeutic Exercise/Strengthening  15. Transfer Training   TREATMENT PLAN:  Effective Dates: 1/13/2017 TO 4/13/2017. Frequency/Duration: 2 times a week for 8 weeks  GOALS: (Goals have been discussed and agreed upon with patient.)  Short Term Goals 4 weeks   1. Carlos Lyons will be independent with HEP  2. Carlos Lyons will participate in LE stretching program to increase flexibility  3. Carlos Lyons will participate in core stabilization exercises to help with stabilization during ADLs  4. Carlos Lyons will participate in LE strengthening program with weights as appropriate to help with gait and elevations  5. Carlos Lyons will participate in static and dynamic balance activities to decrease the risk for falls  6. Carlos Lyons will tolerate manual therapy/joint mobilizations/soft tissue to increase ROM and decrease pain  7. Patient will demonstrate safe body mechanics with lifting, bending, and transfers for improved lumbar spine pain and activity tolerance. Long Term Goals 8 weeks   1. Carlos Lyons will demonstrate a 20 point improvement on the Oswestry to show improvement in function  2. Carlos Lyons will report 0/10 pain at rest and during ADLs  3. Carlos Lyons will demonstrate 5/5 LE strength on manual muscle testing  4. Carlos Lyons will be able to perform SLS >5 seconds bilaterally to help with gait and improve balance  Rehabilitation Potential For Stated Goals: Good  Regarding Antonette Liz's therapy, I certify that the treatment plan above will be carried out by a therapist or under their direction.   Thank you for this referral,  Mahendra Smyth, PT     Referring Physician Signature: Anushaoniel Tatum*              Date                    HISTORY:    History of Present Injury/Illness (Reason for Referral):  Ms. Porsche Pereira reports getting her foot caught between boxes in a freezer at work and falling backwards into a metal rack and boxes on 11/10/2016. She reports immediate pain at that time that has continued and is increased with walking, getting up from the bed, up from a chair, and with sitting greater than 15 minutes. She reports that her pain is right sided and radiates into the right foot. She also reports that she has difficulty with picking any bending, twisting, and lifting. MRI imaging demonstrated no discal injury and no significant structural damage to the lumbar tissue. Patient goals are to return to work and to be safe with back movement for prevention of re-injury or further injury. Patient reports that symptoms have not changed since injury. Past Medical History/Comorbidities:   Ms. Porsche Pereira  has a past medical history of Hypothyroid. Ms. Porsche Pereira  has a past surgical history that includes hysterectomy and cholecystectomy. Social History/Living Environment:     lives in private residence with family  Prior Level of Function/Work/Activity:  Patient worked and was independent with all home and work duties prior to injury  Note: Patient denies any increase of symptoms with cough, sneeze or valsalva. Patient denies any saddle paresthesia or bowel/bladder deficits. Personal Factors:          Coping Style:  Seems to catastrophize injury        Social Background:  Some indications of family stress        Profession:          Past/Current Experience:  No previous history of back pain        Other factors that influence how disability is experienced by the patient:  Previous high level family stress due to loss of spouse in traumatic event.   Current Medications:    Current Outpatient Prescriptions:     diclofenac EC (VOLTAREN) 75 mg EC tablet, Take 1 Tab by mouth two (2) times a day., Disp: 10 Tab, Rfl: 0    levothyroxine (SYNTHROID) 75 mcg tablet, Take  by mouth Daily (before breakfast). , Disp: , Rfl:    Flexeril  norco  Ibeuprophen    Date Last Reviewed:  7/5/2017    Number of Personal Factors/Comorbidities that affect the Plan of Care:    EXAMINATION:    Observation/Orthostatic Postural Assessment:          Antalgic gait pattern with decreased right LE loading during gait. Right trunk lean in standing. Palpation:          Elevated right psis in standing and sitting. Tenderness with palpation of right quadratus lumborum and right piriformis  ROM:            AROM/PROM         Joint: Eval Date: 11/13/2106 Re-Assess Date:  Re-Assess Date:     RIGHT LEFT RIGHT LEFT RIGHT LEFT   Knee Extension         Knee Flexion         Hip Flexion         Hip Abduction         Lumbar Flexion 50% with Pain        Lumbar Extension 50% with pain        Lumbar Side-bending 50% with pain 50%with pain       Lumbar Rotation           Strength:    Joint: Eval Date:  Re-Assess Date:  Re-Assess Date:     RIGHT LEFT RIGHT LEFT RIGHT LEFT   Knee Flexion 5/5 5/5       Knee Extension 5/5 5/5       Hip Flexion 4/5 5/5       Hip Abduction 4/5 5/5       Ankle Dorsiflexion 5/5 5/5                           Single leg stance right/left: needed support on right              Deep squat: unable due to Right LE pain and c/o weakness    Ham 90/90:   RIGHT LE: -40   LEFT LE: -20    Special Tests: Assessed @ Initial Visit    ISRRAEL 4: + on right   SLUMP TEST: +on right  Neurological Screen: Assessed @ Initial Visit    RADIATING SYMPTOMS?: YES  Functional Mobility:  Assessed @ Initial Visit Affecting participation in basic ADLs and functional tasks.   Poor spinal mechanics with supine-sit, sit-stand, and picking up objects that demonstrated increased spinal flexion and discal loading    Balance:          Poor stability on right LE    Body Structures Involved:  1. Nerves  2. Joints  3. Muscles  4. Ligaments 5. Body Functions Affected:  1. Mental  2. Sensory/Pain  3. Neuromusculoskeletal  4. Movement Related  5. postural awareness Activities and Participation Affected:  1. General Tasks and Demands  2. Mobility  3. Self Care  4. Domestic Life  5. Interpersonal Interactions and Relationships  6. Community, Social and [place]  7. work duties   Number of elements that affect the Plan of Care:    CLINICAL PRESENTATION:    Presentation:  Evolving clinical presentation with changing clinical characteristics: MODERATE COMPLEXITY   CLINICAL DECISION MAKING:    Outcome Measure: Tool Used: Modified Oswestry Low Back Pain Questionnaire  Score:  Initial: 35/50  Most Recent: 32/50 (Date: -- )   Interpretation of Score: Each section is scored on a 0-5 scale, 5 representing the greatest disability. The scores of each section are added together for a total score of 50. Score 0 1-10 11-20 21-30 31-40 41-49 50   Modifier CH CI CJ CK CL CM CN       Medical Necessity:   · Skilled intervention continues to be required due to above deficits affecting participation in basic ADLs and overall functional tolerance. Reason for Services/Other Comments:  · Patient continues to require skilled intervention due to  above deficits affecting participation in basic ADLs and overall functional tolerance. ·    Use of outcome tool(s) and clinical judgement create a POC that gives a:    TREATMENT:    (In addition to Assessment/Re-Assessment sessions the following treatments were rendered)  THERAPEUTIC EXERCISE: (25 minutes):  Exercises per grid below to improve mobility, strength and balance. Required minimal visual and verbal cues to promote proper body alignment and promote proper body posture. Progressed resistance, range and complexity of movement as indicated.      Date;  6/28/2017   Activity/Exercise            Patient education on body mechanics    physiostep Level 3  X 5 minutes with moist heat to back   Lower trunk rotation    Supine piriformis stretch    Foam rolling    SB supine walkouts    Balance board    Tandem balance on a/x    Supine ITB stretch    Walking lunge stretch    SLS on A/x 3x30\"   Side steps/monster walks 3x20   Bridges with add/abd 220   Prone quadraped 2x20   TVA in supine with LE marches 2x20       Prone lying    Diaphragmatic breathing in supine hooklying     Lunge steps 3x10 B                             MANUAL THERAPY: (0 minutes): Patient prone for STM with pillow under feet to bilateral low back, quadratus, piriformis, latissimus. Patient with palpable tenderness in right piriformis and upper glut and lumbar paraspinals with with tightness and immobility in lumbar area with myofascial release in this area to help decrease tightness. Soft tissue mobilization and Manipulation was utilized and necessary because of the patient's restricted joint motion, painful spasm, restricted motion of soft tissue. (Used abbreviations: MET - muscle energy technique; PNF - proprioceptive neuromuscular facilitation; NMR - neuromuscular re-education; AP - anterior to posterior; PA - posterior to anterior)  MODALITIES: ( 10 minutes) Moist heat to back while patient on NuStep prior to manual treatment for 10 minutes and following manual patient prone for ice pack x 10 minutes to low back and right piriformis and glut area to help decrease pain and help improve mobility. Pre-treatment report: Patient reports that she feels mild soreness today. Felt improved after last vist  · Pain/ Symptoms: Initial:  5/10 pain in right low back and down right leg to knee Post Session: Patient reporated improved symptoms with TVA cueing. Pain 4/10 ·   Compliance with Program/Exercises: Will assess as treatment progresses. · Recommendations/Intent for next treatment session: \"Next visit will focus on advancements to more challenging activities\".   Total Treatment Duration:  PT Patient Time In/Time Out  Time In: 1300  Time Out: 2717 [facility], PT

## 2017-07-31 NOTE — PROGRESS NOTES
Radha Onofre  : 1975 [facility] at [address]  [phone]   [phone]         OUTPATIENT PHYSICAL THERAPY:Discharge 2017      ICD-10: Treatment Diagnosis:   Lumbago with sciatica, right side (M54.41)       Precautions/Allergies:   Tramadol   Fall Risk Score: 7 (? 5 = High Risk)  MD Orders: Eval and Treat  MEDICAL/REFERRING DIAGNOSIS:  Lumbar radiculopathy [M54.16]   DATE OF ONSET: fall on 11/10/2016  REFERRING PHYSICIAN: Ganga Torres [address]: 2017     INITIAL ASSESSMENT:  Ms. Radha Onofre has attended 23 physical therapy session including initial evaluation. Radha Onofre exhibits decreased flexibility, increased pain, decreased postural and core strength, decreased functional tolerance. Patient demonstrates pelvic malalignment upon initial evaluation with decreased posture and impaired transfer tolerance. Radha Onofre reported some improved activity tolerance, but will be discharged at this time per workers compensation. PROBLEM LIST (Impacting functional limitations):  1. Decreased Strength  2. Decreased ADL/Functional Activities  3. Decreased Transfer Abilities  4. Decreased Ambulation Ability/Technique  5. Decreased Balance  6. Increased Pain  7. Decreased Activity Tolerance  8. Decreased Work Simplification/Energy Conservation Techniques  9. Increased Fatigue  10. Decreased Flexibility/Joint Mobility  11. Decreased Knowledge of Precautions  12. Decreased [place] with Home Exercise Program  13. impaired body mechanics INTERVENTIONS PLANNED:  1. Balance Exercise  2. Bed Mobility  3. Cold  4. Cryotherapy  5. Electrical Stimulation  6. Family Education  7. Gait Training  8. Heat  9. Home Exercise Program (HEP)  10. Manual Therapy  11. Neuromuscular Re-education/Strengthening  12. Range of Motion (ROM)  13. Therapeutic Activites  14.  Therapeutic Exercise/Strengthening  15. Transfer Training   TREATMENT PLAN:  Effective Dates: 1/13/2017 TO 4/13/2017. Frequency/Duration: 2 times a week for 8 weeks  GOALS: (Goals have been discussed and agreed upon with patient.)  Short Term Goals 4 weeks   1. Skip Chiu will be independent with HEP progressed  2. Skip Chiu will participate in LE stretching program to increase flexibility progressed  3. Skip Chiu will participate in core stabilization exercises to help with stabilization during ADLs Progressed  4. Skip Chiu will participate in LE strengthening program with weights as appropriate to help with gait and elevations Progressed  5. Skip Chiu will participate in static and dynamic balance activities to decrease the risk for falls Progressed  6. Skip Chiu will tolerate manual therapy/joint mobilizations/soft tissue to increase ROM and decrease pain progressed  7. Patient will demonstrate safe body mechanics with lifting, bending, and transfers for improved lumbar spine pain and activity tolerance. Progressed  Long Term Goals 8 weeks   1. Skip Chiu will demonstrate a 20 point improvement on the Oswestry to show improvement in function Progressed  2. Skip Chiu will report 0/10 pain at rest and during ADLs Progressed   3. Skip Chiu will demonstrate 5/5 LE strength on manual muscle testing  Progressed  4. Skip Chiu will be able to perform SLS >5 seconds bilaterally to help with gait and improve balance Progressed  Rehabilitation Potential For Stated Goals: Good  Regarding Antonette Liz's therapy, I certify that the treatment plan above will be carried out by a therapist or under their direction.   Thank you for this referral,  Chavo Flores, PT     Referring Physician Signature: Matilde Herman*              Date                    HISTORY:    History of Present Injury/Illness (Reason for Referral):  Ms. Macey Langley reports getting her foot caught between boxes in a freezer at work and falling backwards into a metal rack and boxes on 11/10/2016. She reports immediate pain at that time that has continued and is increased with walking, getting up from the bed, up from a chair, and with sitting greater than 15 minutes. She reports that her pain is right sided and radiates into the right foot. She also reports that she has difficulty with picking any bending, twisting, and lifting. MRI imaging demonstrated no discal injury and no significant structural damage to the lumbar tissue. Patient goals are to return to work and to be safe with back movement for prevention of re-injury or further injury. Patient reports that symptoms have not changed since injury. Past Medical History/Comorbidities:   Ms. Macey Langley  has a past medical history of Hypothyroid. Ms. Macey Langley  has a past surgical history that includes hysterectomy and cholecystectomy. Social History/Living Environment:     lives in private residence with family  Prior Level of Function/Work/Activity:  Patient worked and was independent with all home and work duties prior to injury  Note: Patient denies any increase of symptoms with cough, sneeze or valsalva. Patient denies any saddle paresthesia or bowel/bladder deficits. Personal Factors:          Coping Style:  Seems to catastrophize injury        Social Background:  Some indications of family stress        Profession:          Past/Current Experience:  No previous history of back pain        Other factors that influence how disability is experienced by the patient:  Previous high level family stress due to loss of spouse in traumatic event. Current Medications:    Current Outpatient Prescriptions:     diclofenac EC (VOLTAREN) 75 mg EC tablet, Take 1 Tab by mouth two (2) times a day., Disp: 10 Tab, Rfl: 0    levothyroxine (SYNTHROID) 75 mcg tablet, Take  by mouth Daily (before breakfast). , Disp: , Rfl: Flexeril  norco  Ibeuprophen    Date Last Reviewed:  7/31/2017    Number of Personal Factors/Comorbidities that affect the Plan of Care:    EXAMINATION:    Observation/Orthostatic Postural Assessment:          Antalgic gait pattern with decreased right LE loading during gait. Right trunk lean in standing. Palpation:          Elevated right psis in standing and sitting. Tenderness with palpation of right quadratus lumborum and right piriformis  ROM:            AROM/PROM         Joint: Eval Date: 11/13/2106 Re-Assess Date: 6/28/2017 Re-Assess Date:     RIGHT LEFT RIGHT LEFT RIGHT LEFT   Knee Extension         Knee Flexion         Hip Flexion         Hip Abduction         Lumbar Flexion 50% with Pain   75%     Lumbar Extension 50% with pain   75%     Lumbar Side-bending 50% with pain 50%with pain  75% 75%    Lumbar Rotation           Strength:    Joint: Eval Date:  Re-Assess Date: 6/28/2017 Re-Assess Date:     RIGHT LEFT RIGHT LEFT RIGHT LEFT   Knee Flexion 5/5 5/5 5 5     Knee Extension 5/5 5/5 5 5     Hip Flexion 4/5 5/5 5 5     Hip Abduction 4/5 5/5 4+ 5     Ankle Dorsiflexion 5/5 5/5 5 5                         Single leg stance right/left: needed support on right              Deep squat: unable due to Right LE pain and c/o weakness    Ham 90/90:   RIGHT LE: -30   LEFT LE: -20    Special Tests: Assessed @ Initial Visit    ISRRAEL 4: + on right   SLUMP TEST: +on right  Neurological Screen: Assessed @ Initial Visit    RADIATING SYMPTOMS?: YES  Functional Mobility:  Assessed @ Initial Visit Affecting participation in basic ADLs and functional tasks. Poor spinal mechanics with supine-sit, sit-stand, and picking up objects that demonstrated increased spinal flexion and discal loading    Balance:          Poor stability on right LE    Body Structures Involved:  1. Nerves  2. Joints  3. Muscles  4. Ligaments 5. Body Functions Affected:  1. Mental  2. Sensory/Pain  3. Neuromusculoskeletal  4.  Movement Related  5. postural awareness Activities and Participation Affected:  1. General Tasks and Demands  2. Mobility  3. Self Care  4. Domestic Life  5. Interpersonal Interactions and Relationships  6. Community, Social and [place]  7. work duties   Number of elements that affect the Plan of Care:    CLINICAL PRESENTATION:    Presentation:  Evolving clinical presentation with changing clinical characteristics: MODERATE COMPLEXITY   CLINICAL DECISION MAKING:    Outcome Measure: Tool Used: Modified Oswestry Low Back Pain Questionnaire  Score:  Initial: 35/50  Most Recent: 25/50 (Date: -- )   Interpretation of Score: Each section is scored on a 0-5 scale, 5 representing the greatest disability. The scores of each section are added together for a total score of 50. Score 0 1-10 11-20 21-30 31-40 41-49 50   Modifier CH CI CJ CK CL CM CN       Medical Necessity:   · Skilled intervention continues to be required due to above deficits affecting participation in basic ADLs and overall functional tolerance. Reason for Services/Other Comments:  · Patient continues to require skilled intervention due to  above deficits affecting participation in basic ADLs and overall functional tolerance.  ·    Use of outcome tool(s) and clinical judgement create a POC that gives a:                 Thank You for this referral!,    Isaac Tomlin, PT

## 2018-01-29 ENCOUNTER — HOSPITAL ENCOUNTER (EMERGENCY)
Age: 43
Discharge: HOME | End: 2018-01-29
Attending: EMERGENCY MEDICINE
Payer: SELF-PAY

## 2018-01-29 VITALS
TEMPERATURE: 97.9 F | SYSTOLIC BLOOD PRESSURE: 109 MMHG | HEIGHT: 68 IN | BODY MASS INDEX: 22.28 KG/M2 | WEIGHT: 147 LBS | RESPIRATION RATE: 16 BRPM | DIASTOLIC BLOOD PRESSURE: 48 MMHG | OXYGEN SATURATION: 97 % | HEART RATE: 60 BPM

## 2018-01-29 DIAGNOSIS — K04.7 DENTAL INFECTION: Primary | ICD-10-CM

## 2018-01-29 PROCEDURE — 99282 EMERGENCY DEPT VISIT SF MDM: CPT | Performed by: EMERGENCY MEDICINE

## 2018-01-29 RX ORDER — MELOXICAM 15 MG/1
15 TABLET ORAL DAILY
Qty: 15 TAB | Refills: 0 | Status: SHIPPED | OUTPATIENT
Start: 2018-01-29 | End: 2019-06-02

## 2018-01-29 RX ORDER — AMOXICILLIN 500 MG/1
500 TABLET, FILM COATED ORAL 3 TIMES DAILY
Qty: 21 TAB | Refills: 0 | Status: SHIPPED | OUTPATIENT
Start: 2018-01-29

## 2018-01-29 NOTE — ED PROVIDER NOTES
HPI Comments: 35 yo female who woke up ill this morning. She did crack her right tooth last week and this has been hurting her a little bit more. She states the pain radiates to her jaw and the right side of her face. She has a history of having poor dentition into see a dentist on Thursday. She also has bodyaches, nausea, and some headaches with this as well. Patient is a 36 y.o. female presenting with headaches. The history is provided by the patient. Headache    Associated symptoms include a fever. Pertinent negatives include no palpitations, no shortness of breath, no nausea and no vomiting. Past Medical History:   Diagnosis Date    Hypothyroid        Past Surgical History:   Procedure Laterality Date    HX CHOLECYSTECTOMY      HX HYSTERECTOMY           History reviewed. No pertinent family history. Social History     Social History    Marital status:      Spouse name: N/A    Number of children: N/A    Years of education: N/A     Occupational History    Not on file. Social History Main Topics    Smoking status: Current Some Day Smoker    Smokeless tobacco: Not on file    Alcohol use No    Drug use: No    Sexual activity: Not on file     Other Topics Concern    Not on file     Social History Narrative         ALLERGIES: Bactrim [sulfamethoprim] and Tramadol    Review of Systems   Constitutional: Positive for chills and fever. Negative for activity change, fatigue and unexpected weight change. Respiratory: Negative for cough, chest tightness, shortness of breath, wheezing and stridor. Cardiovascular: Negative for chest pain and palpitations. Gastrointestinal: Negative for abdominal pain, diarrhea, nausea and vomiting. Skin: Negative. Neurological: Positive for headaches. Negative for tremors, seizures and numbness. All other systems reviewed and are negative.       Vitals:    01/29/18 1648   BP: 109/48   Pulse: 60   Resp: 16   Temp: 97.9 °F (36.6 °C) SpO2: 97%   Weight: 66.7 kg (147 lb)   Height: 5' 8\" (1.727 m)            Physical Exam   Constitutional: She is oriented to person, place, and time. She appears well-developed and well-nourished. No distress. HENT:   Head: Normocephalic and atraumatic. Mouth/Throat: Oropharynx is clear and moist. No oropharyngeal exudate. Multiple decaying lower teeth right lower canine is tender diffusely. Eyes: Conjunctivae are normal. No scleral icterus. Neck: Normal range of motion. Neck supple. Pulmonary/Chest: Effort normal. No stridor. No respiratory distress. Neurological: She is alert and oriented to person, place, and time. No focal weakness   Skin: Skin is warm and dry. No rash noted. She is not diaphoretic. No erythema. Psychiatric: She has a normal mood and affect. Her behavior is normal.   Nursing note and vitals reviewed. MDM  Number of Diagnoses or Management Options  Dental infection:   Diagnosis management comments: Patient has dental infection. I am putting patient on antibiotic and she will followup with dentist.        Mary Tovar MD; 1/29/2018 @5:32 PM Voice dictation software was used during the making of this note. This software is not perfect and grammatical and other typographical errors may be present.   This note has not been proofread for errors.  ===================================================================     ED Course       Procedures

## 2018-01-29 NOTE — DISCHARGE INSTRUCTIONS
Abscessed Tooth: Care Instructions  Your Care Instructions    An abscessed tooth is a tooth that has a pocket of pus in the tissues around it. Pus forms when the body tries to fight an infection caused by bacteria. If the pus cannot drain, it forms an abscess. An abscessed tooth can cause red, swollen gums and throbbing pain, especially when you chew. You may have a bad taste in your mouth and a fever, and your jaw may swell. Damage to the tooth, untreated tooth decay, or gum disease can cause an abscessed tooth. An abscessed tooth needs to be treated by a dental professional right away. If it is not treated, the infection could spread to other parts of your body. Your dentist will give you antibiotics to stop the infection. He or she may make a hole in the tooth or cut open (agustín) the abscess inside your mouth so that the infection can drain, which should relieve your pain. You may need to have a root canal treatment, which tries to save your tooth by taking out the infected pulp and replacing it with a healing medicine and/or a filling. If these treatments do not work, your tooth may have to be removed. Follow-up care is a key part of your treatment and safety. Be sure to make and go to all appointments, and call your doctor if you are having problems. It's also a good idea to know your test results and keep a list of the medicines you take. How can you care for yourself at home? · Reduce pain and swelling in your face and jaw by putting ice or a cold pack on the outside of your cheek for 10 to 20 minutes at a time. Put a thin cloth between the ice and your skin. · Take pain medicines exactly as directed. ¨ If the doctor gave you a prescription medicine for pain, take it as prescribed. ¨ If you are not taking a prescription pain medicine, ask your doctor if you can take an over-the-counter medicine. · Take your antibiotics as directed. Do not stop taking them just because you feel better.  You need to take the full course of antibiotics. To prevent tooth abscess  · Brush and floss every day, and have regular dental checkups. · Eat a healthy diet, and avoid sugary foods and drinks. · Do not smoke, use e-cigarettes with nicotine, or use spit tobacco. Tobacco and nicotine slow your ability to heal. Tobacco also increases your risk for gum disease and cancer of the mouth and throat. If you need help quitting, talk to your doctor about stop-smoking programs and medicines. These can increase your chances of quitting for good. When should you call for help? Call 911 anytime you think you may need emergency care. For example, call if:  ? · You have trouble breathing. ?Call your doctor now or seek immediate medical care if:  ? · You have new or worse symptoms of infection, such as:  ¨ Increased pain, swelling, warmth, or redness. ¨ Red streaks leading from the area. ¨ Pus draining from the area. ¨ A fever. ? Watch closely for changes in your health, and be sure to contact your doctor if:  ? · You do not get better as expected. Where can you learn more? Go to http://caroline-margaret.info/. Enter C774 in the search box to learn more about \"Abscessed Tooth: Care Instructions. \"  Current as of: May 12, 2017  Content Version: 11.4  © 1972-3555 [employer]. Care instructions adapted under license by [employer] (which disclaims liability or warranty for this information). If you have questions about a medical condition or this instruction, always ask your healthcare professional. [place] [address] any warranty or liability for your use of this information.

## 2018-01-29 NOTE — ED NOTES
I have reviewed discharge instructions with the patient. The patient verbalized understanding. Patient left ED via Discharge Method: ambulatory to Home with (insert name of family/friend, self, transport self). Opportunity for questions and clarification provided. Patient given 2 scripts. To continue your aftercare when you leave the hospital, you may receive an automated call from our care team to check in on how you are doing. This is a free service and part of our promise to provide the best care and service to meet your aftercare needs.  If you have questions, or wish to unsubscribe from this service please call [phone]. Thank you for Choosing our [facility] Emergency Department.

## 2018-01-29 NOTE — ED TRIAGE NOTES
Pt states she has been nauseated with headache, sore throat and body aches since yesterday but getting worse today.

## 2019-06-02 ENCOUNTER — APPOINTMENT (OUTPATIENT)
Dept: CT IMAGING | Age: 44
End: 2019-06-02
Attending: EMERGENCY MEDICINE
Payer: SELF-PAY

## 2019-06-02 ENCOUNTER — HOSPITAL ENCOUNTER (EMERGENCY)
Age: 44
Discharge: HOME | End: 2019-06-02
Attending: EMERGENCY MEDICINE | Admitting: EMERGENCY MEDICINE
Payer: SELF-PAY

## 2019-06-02 VITALS
HEIGHT: 68 IN | SYSTOLIC BLOOD PRESSURE: 132 MMHG | WEIGHT: 152 LBS | OXYGEN SATURATION: 99 % | BODY MASS INDEX: 23.04 KG/M2 | DIASTOLIC BLOOD PRESSURE: 67 MMHG | RESPIRATION RATE: 16 BRPM | HEART RATE: 70 BPM | TEMPERATURE: 98.1 F

## 2019-06-02 DIAGNOSIS — S09.93XA FACIAL INJURY, INITIAL ENCOUNTER: Primary | ICD-10-CM

## 2019-06-02 PROCEDURE — 74011250637 HC RX REV CODE- 250/637: Performed by: EMERGENCY MEDICINE

## 2019-06-02 PROCEDURE — 70486 CT MAXILLOFACIAL W/O DYE: CPT

## 2019-06-02 PROCEDURE — 99283 EMERGENCY DEPT VISIT LOW MDM: CPT | Performed by: EMERGENCY MEDICINE

## 2019-06-02 PROCEDURE — 70450 CT HEAD/BRAIN W/O DYE: CPT

## 2019-06-02 RX ORDER — MELOXICAM 15 MG/1
15 TABLET ORAL DAILY
Qty: 15 TAB | Refills: 0 | Status: SHIPPED | OUTPATIENT
Start: 2019-06-02

## 2019-06-02 RX ORDER — KETOROLAC TROMETHAMINE 10 MG/1
10 TABLET, FILM COATED ORAL
Status: COMPLETED | OUTPATIENT
Start: 2019-06-02 | End: 2019-06-02

## 2019-06-02 RX ADMIN — KETOROLAC TROMETHAMINE 10 MG: 10 TABLET, FILM COATED ORAL at 18:55

## 2019-06-02 NOTE — DISCHARGE INSTRUCTIONS
Patient Education        Bruised Face: Care Instructions  Your Care Instructions  You can get a bruise on your face if you fall or if something hits you in the face. The medical term for a bruise is \"contusion. \" Small blood vessels get torn and leak blood under the skin. Most people think of a bruise as a black-and-blue spot. But bones and muscles can also get bruised. This may damage deep tissues but not cause a bruise you can see. Your doctor will examine you and will gently press on your face to find areas that are tender. He or she will check your eyes, how well you can move face muscles near the bruise, and your feeling around the area to make sure there isn't a more serious injury, such as a broken bone or nerve damage. You may have tests, including X-rays or other imaging tests like a CT scan. Bruises may cause pain and swelling. But if there is no other damage, they will usually get better in a few weeks with home treatment. Follow-up care is a key part of your treatment and safety. Be sure to make and go to all appointments, and call your doctor if you are having problems. It's also a good idea to know your test results and keep a list of the medicines you take. How can you care for yourself at home? · Put ice or a cold pack on your face for 10 to 20 minutes at a time. Put a thin cloth between the ice and your skin. Try to do this every 1 to 2 hours for the first 3 days (when you are awake) or until the swelling goes down. · Sleep with your head slightly raised until the swelling goes down. Prop up your head and shoulders on pillows. · If you play contact sports, ask your doctor when it's okay to play again. It's safest to wait at least 6 weeks. · Be safe with medicines. Read and follow all instructions on the label. ? If the doctor gave you a prescription medicine for pain, take it as prescribed.   ? If you are not taking a prescription pain medicine, ask your doctor if you can take an over-the-counter medicine. When should you call for help? Call your doctor now or seek immediate medical care if:    · Your pain gets worse.     · You have new or worse swelling.     · You have new or worse bleeding.     · The area near the bruise is tingly, weak, or numb.     · You have vision changes.    Watch closely for changes in your health, and be sure to contact your doctor if:    · You do not get better as expected. Where can you learn more? Go to http://caroline-margaret.info/. Enter T266 in the search box to learn more about \"Bruised Face: Care Instructions. \"  Current as of: September 23, 2018  Content Version: 11.9  © 6758-6073 [employer]. Care instructions adapted under license by [employer] (which disclaims liability or warranty for this information). If you have questions about a medical condition or this instruction, always ask your healthcare professional. [address] any warranty or liability for your use of this information.

## 2019-06-02 NOTE — ED TRIAGE NOTES
Pt ambulatory to triage without complications. Pt states she was working in [employer] yesterday and someone in front of her didn't hold the large plastic curtain/ divider and it hit her in the nose, forehead, and mouth. Pt states she lost consciousness for about a minute then and is a little nauseated today, but no dizziness, confusion, and isn't on blood thinners. Pt denies hitting head on floor when fell because someone caught her and lowered her to ground. ;
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
## 2019-06-02 NOTE — ED NOTES
I have reviewed discharge instructions with the patient. The patient verbalized understanding. Patient left ED via Discharge Method: ambulatory to Home with self    Opportunity for questions and clarification provided. Patient given 1 scripts. To continue your aftercare when you leave the hospital, you may receive an automated call from our care team to check in on how you are doing. This is a free service and part of our promise to provide the best care and service to meet your aftercare needs.  If you have questions, or wish to unsubscribe from this service please call [phone]. Thank you for Choosing our [facility] Emergency Department.

## 2019-06-18 ENCOUNTER — HOSPITAL ENCOUNTER (OUTPATIENT)
Dept: OCCUPATIONAL MEDICINE | Age: 44
Discharge: HOME | End: 2019-06-18

## 2019-06-18 DIAGNOSIS — T14.90XA INJURY: ICD-10-CM

## 2019-10-03 ENCOUNTER — HOSPITAL ENCOUNTER (OUTPATIENT)
Dept: LAB | Age: 44
Discharge: HOME | End: 2019-10-03

## 2019-10-03 LAB — HBV SURFACE AB SERPL IA-ACNC: 3.67 MIU/ML

## 2019-10-03 PROCEDURE — 86787 VARICELLA-ZOSTER ANTIBODY: CPT

## 2019-10-03 PROCEDURE — 86706 HEP B SURFACE ANTIBODY: CPT

## 2019-10-05 LAB — VZV IGG SER IA-ACNC: 634 INDEX

## 2019-12-15 ENCOUNTER — HOSPITAL ENCOUNTER (EMERGENCY)
Age: 44
Discharge: HOME | End: 2019-12-15
Attending: EMERGENCY MEDICINE
Payer: SELF-PAY

## 2019-12-15 VITALS
HEART RATE: 86 BPM | DIASTOLIC BLOOD PRESSURE: 75 MMHG | HEIGHT: 68 IN | SYSTOLIC BLOOD PRESSURE: 128 MMHG | WEIGHT: 160 LBS | RESPIRATION RATE: 18 BRPM | OXYGEN SATURATION: 96 % | TEMPERATURE: 98.1 F | BODY MASS INDEX: 24.25 KG/M2

## 2019-12-15 DIAGNOSIS — I83.813 VARICOSE VEINS OF BOTH LOWER EXTREMITIES WITH PAIN: Primary | ICD-10-CM

## 2019-12-15 LAB
ALBUMIN SERPL-MCNC: 3.6 G/DL (ref 3.5–5)
ALBUMIN/GLOB SERPL: 1.1 {RATIO} (ref 1.2–3.5)
ALP SERPL-CCNC: 57 U/L (ref 50–136)
ALT SERPL-CCNC: 25 U/L (ref 12–65)
ANION GAP SERPL CALC-SCNC: 2 MMOL/L (ref 7–16)
AST SERPL-CCNC: 25 U/L (ref 15–37)
BASOPHILS # BLD: 0 K/UL (ref 0–0.2)
BASOPHILS NFR BLD: 0 % (ref 0–2)
BILIRUB SERPL-MCNC: 0.6 MG/DL (ref 0.2–1.1)
BUN SERPL-MCNC: 8 MG/DL (ref 6–23)
CALCIUM SERPL-MCNC: 8.7 MG/DL (ref 8.3–10.4)
CHLORIDE SERPL-SCNC: 112 MMOL/L (ref 98–107)
CO2 SERPL-SCNC: 28 MMOL/L (ref 21–32)
CREAT SERPL-MCNC: 0.83 MG/DL (ref 0.6–1)
DIFFERENTIAL METHOD BLD: ABNORMAL
EOSINOPHIL # BLD: 0.2 K/UL (ref 0–0.8)
EOSINOPHIL NFR BLD: 4 % (ref 0.5–7.8)
ERYTHROCYTE [DISTWIDTH] IN BLOOD BY AUTOMATED COUNT: 12.7 % (ref 11.9–14.6)
GLOBULIN SER CALC-MCNC: 3.3 G/DL (ref 2.3–3.5)
GLUCOSE SERPL-MCNC: 66 MG/DL (ref 65–100)
HCT VFR BLD AUTO: 35.6 % (ref 35.8–46.3)
HGB BLD-MCNC: 11.6 G/DL (ref 11.7–15.4)
IMM GRANULOCYTES # BLD AUTO: 0 K/UL (ref 0–0.5)
IMM GRANULOCYTES NFR BLD AUTO: 0 % (ref 0–5)
LYMPHOCYTES # BLD: 1.9 K/UL (ref 0.5–4.6)
LYMPHOCYTES NFR BLD: 42 % (ref 13–44)
MCH RBC QN AUTO: 30.7 PG (ref 26.1–32.9)
MCHC RBC AUTO-ENTMCNC: 32.6 G/DL (ref 31.4–35)
MCV RBC AUTO: 94.2 FL (ref 79.6–97.8)
MONOCYTES # BLD: 0.4 K/UL (ref 0.1–1.3)
MONOCYTES NFR BLD: 9 % (ref 4–12)
NEUTS SEG # BLD: 2.1 K/UL (ref 1.7–8.2)
NEUTS SEG NFR BLD: 45 % (ref 43–78)
NRBC # BLD: 0 K/UL (ref 0–0.2)
PLATELET # BLD AUTO: 161 K/UL (ref 150–450)
PMV BLD AUTO: 10.1 FL (ref 9.4–12.3)
POTASSIUM SERPL-SCNC: 3.9 MMOL/L (ref 3.5–5.1)
PROT SERPL-MCNC: 6.9 G/DL (ref 6.3–8.2)
RBC # BLD AUTO: 3.78 M/UL (ref 4.05–5.2)
SODIUM SERPL-SCNC: 142 MMOL/L (ref 136–145)
WBC # BLD AUTO: 4.6 K/UL (ref 4.3–11.1)

## 2019-12-15 PROCEDURE — 85025 COMPLETE CBC W/AUTO DIFF WBC: CPT

## 2019-12-15 PROCEDURE — 80053 COMPREHEN METABOLIC PANEL: CPT

## 2019-12-15 PROCEDURE — 99283 EMERGENCY DEPT VISIT LOW MDM: CPT | Performed by: EMERGENCY MEDICINE

## 2019-12-15 PROCEDURE — 74011250637 HC RX REV CODE- 250/637: Performed by: EMERGENCY MEDICINE

## 2019-12-15 RX ORDER — IBUPROFEN 800 MG/1
800 TABLET ORAL ONCE
Status: COMPLETED | OUTPATIENT
Start: 2019-12-15 | End: 2019-12-15

## 2019-12-15 RX ADMIN — IBUPROFEN 800 MG: 800 TABLET ORAL at 16:00

## 2019-12-15 NOTE — DISCHARGE INSTRUCTIONS
Use Tylenol or ibuprofen as needed for pain. Wear some support stockings to help with the swelling and discomfort. Follow-up with your specialist as scheduled.

## 2019-12-15 NOTE — ED PROVIDER NOTES
Patient is a 19-year-old female with history of hypothyroidism. She comes to the ER today complaining of some pain and swelling in her lower legs. She states it is chronic and is been going on for a long time. She was recently seen at a different emergency department and states she has scheduled follow-up in a few weeks with a vascular specialist.  Today she said she had some increased pain in the left lower leg. She denies any injury or trauma. No known fall. She states she also had some bleeding briefly from a chronic appearing wound. The history is provided by the patient. Leg Pain    This is a chronic problem. The current episode started more than 1 week ago. The problem occurs daily. The pain is present in the left lower leg and right lower leg. The quality of the pain is described as aching. The pain is mild. Pertinent negatives include no numbness, full range of motion, no back pain and no neck pain. She has tried nothing for the symptoms. There has been no history of extremity trauma. Past Medical History:   Diagnosis Date    Hypothyroid        Past Surgical History:   Procedure Laterality Date    HX CHOLECYSTECTOMY      HX HYSTERECTOMY           History reviewed. No pertinent family history.     Social History     Socioeconomic History    Marital status:      Spouse name: Not on file    Number of children: Not on file    Years of education: Not on file    Highest education level: Not on file   Occupational History    Not on file   Social Needs    Financial resource strain: Not on file    Food insecurity:     Worry: Not on file     Inability: Not on file    Transportation needs:     Medical: Not on file     Non-medical: Not on file   Tobacco Use    Smoking status: Current Some Day Smoker     Packs/day: 0.50    Smokeless tobacco: Never Used   Substance and Sexual Activity    Alcohol use: No    Drug use: No    Sexual activity: Not on file   Lifestyle    Physical activity:     Days per week: Not on file     Minutes per session: Not on file    Stress: Not on file   Relationships    Social connections:     Talks on phone: Not on file     Gets together: Not on file     Attends Taoist service: Not on file     Active member of club or organization: Not on file     Attends meetings of clubs or organizations: Not on file     Relationship status: Not on file    Intimate partner violence:     Fear of current or ex partner: Not on file     Emotionally abused: Not on file     Physically abused: Not on file     Forced sexual activity: Not on file   Other Topics Concern    Not on file   Social History Narrative    Not on file         ALLERGIES: Bactrim [sulfamethoprim] and Tramadol    Review of Systems   Constitutional: Negative for chills, fatigue and fever. HENT: Negative for congestion, rhinorrhea and sore throat. Eyes: Negative for pain, discharge and visual disturbance. Respiratory: Negative for cough and shortness of breath. Cardiovascular: Negative for chest pain and palpitations. Gastrointestinal: Negative for abdominal pain, diarrhea and nausea. Endocrine: Negative for polydipsia and polyuria. Genitourinary: Negative for dysuria, frequency and urgency. Musculoskeletal: Negative for back pain and neck pain. Skin: Negative for rash. Neurological: Negative for seizures, syncope, weakness and numbness. Hematological: Negative. Vitals:    12/15/19 1402   BP: 128/75   Pulse: 86   Resp: 18   Temp: 98.1 °F (36.7 °C)   SpO2: 96%   Weight: 72.6 kg (160 lb)   Height: 5' 8\" (1.727 m)            Physical Exam  Vitals signs and nursing note reviewed. Constitutional:       Appearance: Normal appearance. She is well-developed. HENT:      Head: Normocephalic and atraumatic. Eyes:      Conjunctiva/sclera: Conjunctivae normal.      Pupils: Pupils are equal, round, and reactive to light.    Neck:      Musculoskeletal: Normal range of motion and neck supple. Cardiovascular:      Rate and Rhythm: Normal rate and regular rhythm. Pulses: Normal pulses. Pulmonary:      Effort: Pulmonary effort is normal.      Breath sounds: Normal breath sounds. Abdominal:      Palpations: Abdomen is soft. Tenderness: There is no tenderness. There is no guarding or rebound. Musculoskeletal: Normal range of motion. General: No deformity. Right lower leg: Edema present. Left lower leg: Edema present. Comments: Chronic appearing venous stasis skin changes to bilateral lower extremities with extensive varicose veins. No active bleeding. Lymphadenopathy:      Cervical: No cervical adenopathy. Skin:     General: Skin is warm and dry. Capillary Refill: Capillary refill takes less than 2 seconds. Findings: No rash. Neurological:      Mental Status: She is alert and oriented to person, place, and time. GCS: GCS eye subscore is 4. GCS verbal subscore is 5. GCS motor subscore is 6. Cranial Nerves: No cranial nerve deficit. Sensory: No sensory deficit. Motor: No weakness. MDM  Number of Diagnoses or Management Options  Diagnosis management comments: Laboratory evaluation is unremarkable. I think this is more chronic venous stasis and chronic peripheral vascular disease with some varicose veins. There is no active bleeding now. Advised patient to use Tylenol or ibuprofen for pain. She can purchase some support stockings or support hose to help with the legs. She has scheduled follow-up. She did have to leave work today so I will give her a note. Voice dictation software was used during the making of this note. This software is not perfect and grammatical and other typographical errors may be present. This note has been proofread, but may still contain errors.   Levi Rosado MD; 12/15/2019 @3:42 PM   ===================================================================         Amount and/or Complexity of Data Reviewed  Clinical lab tests: ordered and reviewed  Review and summarize past medical records: yes  Independent visualization of images, tracings, or specimens: yes    Risk of Complications, Morbidity, and/or Mortality  Presenting problems: low  Diagnostic procedures: minimal  Management options: minimal    Patient Progress  Patient progress: stable         Procedures

## 2019-12-15 NOTE — ED TRIAGE NOTES
Pt states her left hip has been hurting since this morning. Supposed to see vascular surgeon on January 12th. Has discoloration to distal leg right above ankle.

## 2019-12-15 NOTE — ED NOTES
I have reviewed discharge instructions with the patient. The patient verbalized understanding. Patient left ED via Discharge Method: ambulatory to Home with (self). Opportunity for questions and clarification provided. Patient given 0 scripts. To continue your aftercare when you leave the hospital, you may receive an automated call from our care team to check in on how you are doing. This is a free service and part of our promise to provide the best care and service to meet your aftercare needs.  If you have questions, or wish to unsubscribe from this service please call [phone]. Thank you for Choosing our [facility] Emergency Department.

## 2019-12-15 NOTE — LETTER
[address] [facility] EMERGENCY DEPT 
ONE  [address] [facility] [address] [facility] 
[phone] Work/School Note Date: 12/15/2019 To Whom It May concern: 
 
Steve Nunes was seen and treated today in the emergency room by the following provider(s): 
Attending Provider: Madonna Jaimes MD. Steve Nunes {Return to school/sport/work:12/16/2019 Sincerely, Erla Hodgkins, MD

## 2022-07-22 ENCOUNTER — APPOINTMENT (OUTPATIENT)
Dept: GENERAL RADIOLOGY | Age: 47
End: 2022-07-22
Payer: OTHER MISCELLANEOUS

## 2022-07-22 ENCOUNTER — HOSPITAL ENCOUNTER (EMERGENCY)
Age: 47
Discharge: HOME | End: 2022-07-22
Attending: EMERGENCY MEDICINE
Payer: OTHER MISCELLANEOUS

## 2022-07-22 VITALS
SYSTOLIC BLOOD PRESSURE: 122 MMHG | HEART RATE: 86 BPM | RESPIRATION RATE: 17 BRPM | HEIGHT: 68 IN | BODY MASS INDEX: 29.25 KG/M2 | DIASTOLIC BLOOD PRESSURE: 75 MMHG | OXYGEN SATURATION: 99 % | TEMPERATURE: 98.9 F | WEIGHT: 193 LBS

## 2022-07-22 DIAGNOSIS — S39.012A STRAIN OF LUMBAR REGION, INITIAL ENCOUNTER: ICD-10-CM
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
## 2022-07-22 PROCEDURE — 72100 X-RAY EXAM L-S SPINE 2/3 VWS: CPT

## 2022-07-22 PROCEDURE — 99283 EMERGENCY DEPT VISIT LOW MDM: CPT

## 2022-07-22 RX ORDER — CYCLOBENZAPRINE HCL 10 MG
10 TABLET ORAL NIGHTLY PRN
Qty: 10 TABLET | Refills: 0 | Status: SHIPPED | OUTPATIENT
Start: 2022-07-22 | End: 2022-08-01

## 2022-07-22 ASSESSMENT — ENCOUNTER SYMPTOMS
VOMITING: 0
BACK PAIN: 1
COUGH: 0
NAUSEA: 0

## 2022-07-22 ASSESSMENT — LIFESTYLE VARIABLES
HOW OFTEN DO YOU HAVE A DRINK CONTAINING ALCOHOL: NEVER
HOW MANY STANDARD DRINKS CONTAINING ALCOHOL DO YOU HAVE ON A TYPICAL DAY: PATIENT DOES NOT DRINK

## 2022-07-22 ASSESSMENT — PAIN DESCRIPTION - ORIENTATION: ORIENTATION: LOWER

## 2022-07-22 ASSESSMENT — PAIN DESCRIPTION - LOCATION: LOCATION: BACK

## 2022-07-22 ASSESSMENT — PAIN SCALES - GENERAL: PAINLEVEL_OUTOF10: 9
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
## 2022-07-22 NOTE — ED NOTES
I have reviewed discharge instructions with the patient. The patient verbalized understanding. Patient left ED via Discharge Method: ambulatory to Home with self. Opportunity for questions and clarification provided. Patient given 1 scripts. To continue your aftercare when you leave the hospital, you may receive an automated call from our care team to check in on how you are doing. This is a free service and part of our promise to provide the best care and service to meet your aftercare needs.  If you have questions, or wish to unsubscribe from this service please call [phone]. Thank you for Choosing our [facility] Emergency Department.         Nichole Kang RN  07/22/22 [id number]
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
